# Patient Record
Sex: FEMALE | Race: WHITE | Employment: UNEMPLOYED | URBAN - METROPOLITAN AREA
[De-identification: names, ages, dates, MRNs, and addresses within clinical notes are randomized per-mention and may not be internally consistent; named-entity substitution may affect disease eponyms.]

---

## 2017-01-06 ENCOUNTER — TELEPHONE (OUTPATIENT)
Dept: PAIN MANAGEMENT | Age: 47
End: 2017-01-06

## 2017-01-06 NOTE — TELEPHONE ENCOUNTER
The pt called the office to request a one day early fill on her medication due to inclement weather. She is due to fill tomorrow 01/07/17. I spoke to the provider about the pt's request and she states that this is ok to do for the pt. I called the pt's [facility] and spoke to the pharmacist. They will not take the verbal order over the phone and need to speak to the provider that ordered the prescription. The provider had to leave the office for a family emergency and was not able to be contacted for the pharmacy. The pt will not be able to get her medication today. The pharmacist would not speak to the supervising physician and would only speak to the provider that wrote the prescription. The Clinical Supervisior Cynthia Knox RN will call and inform the pt.

## 2017-01-17 DIAGNOSIS — I10 ESSENTIAL HYPERTENSION: ICD-10-CM

## 2017-01-18 RX ORDER — LISINOPRIL 40 MG/1
TABLET ORAL
Qty: 30 TAB | Refills: 4 | Status: SHIPPED | OUTPATIENT
Start: 2017-01-18 | End: 2017-04-25 | Stop reason: SDUPTHER

## 2017-02-01 ENCOUNTER — OFFICE VISIT (OUTPATIENT)
Dept: PAIN MANAGEMENT | Age: 47
End: 2017-02-01

## 2017-02-01 VITALS
BODY MASS INDEX: 39.68 KG/M2 | HEART RATE: 84 BPM | DIASTOLIC BLOOD PRESSURE: 108 MMHG | WEIGHT: 261 LBS | SYSTOLIC BLOOD PRESSURE: 176 MMHG

## 2017-02-01 DIAGNOSIS — M51.37 DEGENERATION OF LUMBAR OR LUMBOSACRAL INTERVERTEBRAL DISC: ICD-10-CM

## 2017-02-01 DIAGNOSIS — M46.1 SACROILIITIS (HCC): ICD-10-CM

## 2017-02-01 DIAGNOSIS — M47.817 LUMBOSACRAL SPONDYLOSIS WITHOUT MYELOPATHY: ICD-10-CM

## 2017-02-01 DIAGNOSIS — M54.17 LUMBOSACRAL RADICULOPATHY: Primary | ICD-10-CM

## 2017-02-01 DIAGNOSIS — Z79.899 ENCOUNTER FOR LONG-TERM (CURRENT) USE OF HIGH-RISK MEDICATION: ICD-10-CM

## 2017-02-01 DIAGNOSIS — Z79.899 ENCOUNTER FOR LONG-TERM (CURRENT) USE OF MEDICATIONS: ICD-10-CM

## 2017-02-01 DIAGNOSIS — G56.03 BILATERAL CARPAL TUNNEL SYNDROME: ICD-10-CM

## 2017-02-01 LAB
ALCOHOL UR POC: NORMAL
AMPHETAMINES UR POC: NEGATIVE
BARBITURATES UR POC: NEGATIVE
BENZODIAZEPINES UR POC: NEGATIVE
BUPRENORPHINE UR POC: NORMAL
CANNABINOIDS UR POC: NEGATIVE
CARISOPRODOL UR POC: NORMAL
COCAINE UR POC: NEGATIVE
FENTANYL UR POC: NORMAL
MDMA/ECSTASY UR POC: NEGATIVE
METHADONE UR POC: NORMAL
METHAMPHETAMINE UR POC: NEGATIVE
METHYLPHENIDATE UR POC: NEGATIVE
OPIATES UR POC: NORMAL
OXYCODONE UR POC: NEGATIVE
PHENCYCLIDINE UR POC: NORMAL
PROPOXYPHENE UR POC: NORMAL
TRAMADOL UR POC: NORMAL
TRICYCLICS UR POC: NEGATIVE

## 2017-02-01 RX ORDER — HYDROMORPHONE HYDROCHLORIDE 4 MG/1
4 TABLET ORAL 4 TIMES DAILY
Qty: 120 TAB | Refills: 0 | Status: SHIPPED | OUTPATIENT
Start: 2017-04-04 | End: 2017-02-01 | Stop reason: SDUPTHER

## 2017-02-01 RX ORDER — METHADONE HYDROCHLORIDE 10 MG/1
20 TABLET ORAL EVERY 8 HOURS
Qty: 180 TAB | Refills: 0 | Status: SHIPPED | OUTPATIENT
Start: 2017-04-04 | End: 2017-05-02 | Stop reason: SDUPTHER

## 2017-02-01 RX ORDER — HYDROMORPHONE HYDROCHLORIDE 4 MG/1
4 TABLET ORAL 4 TIMES DAILY
Qty: 120 TAB | Refills: 0 | Status: SHIPPED | OUTPATIENT
Start: 2017-03-06 | End: 2017-05-02 | Stop reason: SDUPTHER

## 2017-02-01 RX ORDER — METHADONE HYDROCHLORIDE 10 MG/1
20 TABLET ORAL EVERY 8 HOURS
Qty: 180 TAB | Refills: 0 | Status: SHIPPED | OUTPATIENT
Start: 2017-02-05 | End: 2017-05-02 | Stop reason: SDUPTHER

## 2017-02-01 RX ORDER — METHADONE HYDROCHLORIDE 10 MG/1
20 TABLET ORAL EVERY 8 HOURS
Qty: 180 TAB | Refills: 0 | Status: SHIPPED | OUTPATIENT
Start: 2017-03-06 | End: 2017-05-02 | Stop reason: SDUPTHER

## 2017-02-01 RX ORDER — HYDROMORPHONE HYDROCHLORIDE 4 MG/1
4 TABLET ORAL 4 TIMES DAILY
Qty: 120 TAB | Refills: 0 | Status: SHIPPED | OUTPATIENT
Start: 2017-02-05 | End: 2017-05-02 | Stop reason: SDUPTHER

## 2017-02-01 NOTE — MR AVS SNAPSHOT
Visit Information Date & Time Provider Department Dept. Phone Encounter #  
 2/1/2017 11:00 AM Kathryn Horton MD [facility]  Follow-up Instructions Return in about 3 months (around 5/1/2017). Follow-up and Disposition History Upcoming Health Maintenance Date Due  
 PAP AKA CERVICAL CYTOLOGY 2/10/1990 INFLUENZA AGE 9 TO ADULT 8/1/2016 DTaP/Tdap/Td series (2 - Td) 7/12/2026 Allergies as of 2/1/2017  Review Complete On: 2/1/2017 By: Kathryn Horton MD  
  
 Severity Noted Reaction Type Reactions Bactrim [Sulfamethoprim Ds]  02/20/2015    Rash Current Immunizations  Reviewed on 12/28/2012 No immunizations on file. Not reviewed this visit You Were Diagnosed With   
  
 Codes Comments Lumbosacral radiculopathy    -  Primary ICD-10-CM: M54.17 ICD-9-CM: 724.4 Encounter for long-term (current) use of medications     ICD-10-CM: Z79.899 ICD-9-CM: V58.69 Bilateral carpal tunnel syndrome     ICD-10-CM: G56.03 
ICD-9-CM: 354.0 Lumbosacral spondylosis without myelopathy     ICD-10-CM: W90.568 ICD-9-CM: 721.3 Sacroiliitis ([facility] [address]     ICD-10-CM: M46.1 ICD-9-CM: 720.2 Degeneration of lumbar or lumbosacral intervertebral disc     ICD-10-CM: M51.37 
ICD-9-CM: 722.52 Encounter for long-term (current) use of high-risk medication     ICD-10-CM: Z79.899 ICD-9-CM: V58.69 Vitals BP Pulse Weight(growth percentile) BMI OB Status Smoking Status (!) 176/108 (BP 1 Location: Right arm, BP Patient Position: Sitting) 84 261 lb (118.4 kg) 39.68 kg/m2 Postmenopausal Former Smoker Vitals History BMI and BSA Data Body Mass Index Body Surface Area  
 39.68 kg/m 2 2.38 m 2 Preferred Pharmacy Pharmacy Name Phone [facility] [address] [place], [address] [phone] Your Updated Medication List  
  
   
 This list is accurate as of: 2/1/17  1:00 PM.  Always use your most recent med list. amLODIPine 5 mg tablet Commonly known as:  Isabella Fraction Take 1 Tab by mouth daily. buPROPion  mg tablet Commonly known as:  Arnulfo Greaser Take 1 Tab by mouth every morning. * HYDROmorphone 4 mg tablet Commonly known as:  DILAUDID Take 1 Tab by mouth four (4) times daily for 30 days. Max Daily Amount: 16 mg. Prn severe pain  Indications: PAIN Start taking on:  2/5/2017  
  
 * HYDROmorphone 4 mg tablet Commonly known as:  DILAUDID Take 1 Tab by mouth four (4) times daily for 30 days. Max Daily Amount: 16 mg. Prn severe pain  Indications: PAIN Start taking on:  3/6/2017  
  
 lisinopril 40 mg tablet Commonly known as:  PRINIVIL, ZESTRIL  
TAKE 1 TABLET BY MOUTH DAILY * methadone 10 mg tablet Commonly known as:  DOLOPHINE Take  by mouth every four (4) hours as needed for Pain. * methadone 10 mg tablet Commonly known as:  DOLOPHINE Take 2 Tabs by mouth every eight (8) hours for 30 days. For chronic pain. Indications: CHRONIC PAIN, SEVERE PAIN Start taking on:  2/5/2017 * methadone 10 mg tablet Commonly known as:  DOLOPHINE Take 2 Tabs by mouth every eight (8) hours for 30 days. For chronic pain. Indications: CHRONIC PAIN, SEVERE PAIN Start taking on:  3/6/2017 * methadone 10 mg tablet Commonly known as:  DOLOPHINE Take 2 Tabs by mouth every eight (8) hours for 30 days. For chronic pain. Indications: CHRONIC PAIN, SEVERE PAIN Start taking on:  4/4/2017 OTHER Portable lightweight wheelchair * Notice: This list has 6 medication(s) that are the same as other medications prescribed for you. Read the directions carefully, and ask your doctor or other care provider to review them with you. Prescriptions Printed Refills  
 methadone (DOLOPHINE) 10 mg tablet 0 Starting on: 4/4/2017 Sig: Take 2 Tabs by mouth every eight (8) hours for 30 days. For chronic pain. Indications: CHRONIC PAIN, SEVERE PAIN Class: Print Route: Oral  
 methadone (DOLOPHINE) 10 mg tablet 0 Starting on: 3/6/2017 Sig: Take 2 Tabs by mouth every eight (8) hours for 30 days. For chronic pain. Indications: CHRONIC PAIN, SEVERE PAIN Class: Print Route: Oral  
 methadone (DOLOPHINE) 10 mg tablet 0 Starting on: 2/5/2017 Sig: Take 2 Tabs by mouth every eight (8) hours for 30 days. For chronic pain. Indications: CHRONIC PAIN, SEVERE PAIN Class: Print Route: Oral  
 HYDROmorphone (DILAUDID) 4 mg tablet 0 Starting on: 3/6/2017 Sig: Take 1 Tab by mouth four (4) times daily for 30 days. Max Daily Amount: 16 mg. Prn severe pain  Indications: PAIN Class: Print Route: Oral  
 HYDROmorphone (DILAUDID) 4 mg tablet 0 Starting on: 2/5/2017 Sig: Take 1 Tab by mouth four (4) times daily for 30 days. Max Daily Amount: 16 mg. Prn severe pain  Indications: PAIN Class: Print Route: Oral  
  
We Performed the Following AMB POC DRUG SCREEN () [ HCP] AMB POC EKG ROUTINE W/ 12 LEADS, INTER & REP [24671 CPT(R)] DRUG SCREEN [[id number] Custom] Follow-up Instructions Return in about 3 months (around 5/1/2017). Patient Instructions Current health maintenance issues were reviewed and the patient was advised to followup with his/her PCP for completion of these items. Introducing hospitals & [facility] SERVICES! Luis Cohen introduces [employer] patient portal. Now you can access parts of your medical record, email your doctor's office, and request medication refills online. 1. In your internet browser, go to [email or web address] 2. Click on the First Time User? Click Here link in the Sign In box. You will see the New Member Sign Up page. 3. Enter your [employer] Access Code exactly as it appears below.  You will not need to use this code after youve completed the sign-up process. If you do not sign up before the expiration date, you must request a new code. · [employer] Access Code: [email or web address] Expires: 5/2/2017 11:49 AM 
 
4. Enter the last four digits of your Social Security Number (xxxx) and Date of Birth (mm/dd/yyyy) as indicated and click Submit. You will be taken to the next sign-up page. 5. Create a [employer] ID. This will be your [employer] login ID and cannot be changed, so think of one that is secure and easy to remember. 6. Create a [employer] password. You can change your password at any time. 7. Enter your Password Reset Question and Answer. This can be used at a later time if you forget your password. 8. Enter your e-mail address. You will receive e-mail notification when new information is available in [address] 9. Click Sign Up. You can now view and download portions of your medical record. 10. Click the Download Summary menu link to download a portable copy of your medical information. If you have questions, please visit the Frequently Asked Questions section of the [employer] website. Remember, [employer] is NOT to be used for urgent needs. For medical emergencies, dial 911. Now available from your iPhone and Android! Please provide this summary of care documentation to your next provider. Your primary care clinician is listed as Eric Soares. If you have any questions after today's visit, please call [phone].

## 2017-02-01 NOTE — PROGRESS NOTES
Nursing Notes    Patient presents to the office today in follow-up. Patient rates her pain at 6/10 on the numerical pain scale. Reviewed medications with counts as follows:    Rx Date filled Qty Dispensed Pill Count Last Dose Short     Dilaudid 4mg 01/07/2017 120 15 This am  No    Methadone 10mg 01/07/2017 180 25 This am  No                                     Comments:     POC UDS was performed in office today    Any new labs or imaging since last appointment? NO    Have you been to an emergency room (ER) or urgent care clinic since your last visit? NO            Have you been hospitalized since your last visit? NO     If yes, where, when, and reason for visit? Have you seen or consulted any other health care providers outside of the [address] [place] [address]  since your last visit? NO     If yes, where, when, and reason for visit? HM deferred to pcp.

## 2017-02-01 NOTE — PROGRESS NOTES
HISTORY OF PRESENT ILLNESS  Vivian Newby is a 51 y.o. female. HPI she returns for follow-up of chronic, severe pain involving the cervical and lumbar spine related to cervical and lumbar degenerative disc disease with spondylosis and radiculopathy. Since last seen, she has been experiencing increasingly severe left knee pain swelling and crepitus. No clear history of trauma is elicited. She is scheduled to follow-up with her PCP and will obtain x-ray studies which she will forward to us. The possibility of intra-articular steroid injection and MRI was discussed with patient and will be dependent on the results of the x-rays. Pain continues under good control, averaging 6 out of 10 with 60% overall relief. She still has not obtained her MRI of the lumbar spine which will be reordered. Physical activity and mobility, mood, and sleep are all fair. No reported side effects. A review of the [place] prescription monitoring program does not identify any inconsistency    UDS obtained and reviewed; formal confirmation from laboratory is pending. Review of Systems   Constitutional: Positive for malaise/fatigue. Negative for chills and fever. Eyes: Negative for blurred vision and double vision. Respiratory: Negative for cough and shortness of breath. Cardiovascular: Negative for chest pain and leg swelling. Gastrointestinal: Negative for constipation. Musculoskeletal: Positive for back pain, joint pain, myalgias and neck pain. Negative for falls. Neurological: Positive for headaches. Endo/Heme/Allergies: Does not bruise/bleed easily. Psychiatric/Behavioral: Positive for depression. Negative for suicidal ideas. The patient is not nervous/anxious and does not have insomnia. All other systems reviewed and are negative. Physical Exam   Constitutional: She is oriented to person, place, and time. She appears well-developed and well-nourished. No distress.    Overweight    HENT: Head: Normocephalic. Eyes: Conjunctivae and EOM are normal. Pupils are equal, round, and reactive to light.   glasses   Neck: Normal range of motion. No thyromegaly present. Painful ROM   Pulmonary/Chest: Effort normal. No respiratory distress. Musculoskeletal: She exhibits tenderness (neck/lumbar). She exhibits no edema. Right hip: She exhibits decreased range of motion and tenderness (over TB). Left hip: She exhibits tenderness (over TB). Cervical back: She exhibits decreased range of motion, tenderness, pain and spasm. Lumbar back: She exhibits decreased range of motion, tenderness, pain and spasm. Neurological: She is alert and oriented to person, place, and time. Gait (antalgic) abnormal.   Psychiatric: She has a normal mood and affect. Her behavior is normal. Judgment and thought content normal.   Nursing note and vitals reviewed. ASSESSMENT and PLAN  Encounter Diagnoses   Name Primary?  Lumbosacral radiculopathy Yes    Encounter for long-term (current) use of medications     Bilateral carpal tunnel syndrome     Lumbosacral spondylosis without myelopathy     Sacroiliitis (HCC)     Degeneration of lumbar or lumbosacral intervertebral disc     Encounter for long-term (current) use of high-risk medication      Treatment plan as noted above. She will continue on her current analgesic regimen as this is providing good pain control with improve functionality and minimal side effects. 3 month reassess her    No concerns are raised for misuse, abuse, or diversion. 1. Pain medications are prescribed with the objective of pain relief and improved physical and psychosocial function in this patient. 2. Counseled patient on proper use of prescribed medications and reviewed opioid contract. 3. Counseled patient about chronic medical conditions and their relationship to anxiety and depression and recommended mental health support as needed.   4. Reviewed with patient self-help tools, home exercise, and lifestyle changes to assist the patient in self-management of symptoms. 5. Advised patient to have a primary care provider to continue care for health maintenance and general medical conditions and support for referral to specialty care as needed. 6. Reviewed with patient the treatment plan, goals of treatment plan, and limitations of treatment plan, to include the potential for side effects from medications and procedures. If side effects occur, it is the responsibility of the patient to inform the clinic so that a change in the treatment plan can be made in a safe manner. The patient is advised that stopping prescribed medication may cause an increase in symptoms and possible medication withdrawal symptoms. The patient is informed an emergency room evaluation may be necessary if this occurs. DISPOSITION: The patients condition and plan were discussed at length and all questions were answered. The patient agrees with the plan.     Counseling occupied > 50% of visit:  Total time: 30 minutes

## 2017-04-25 ENCOUNTER — OFFICE VISIT (OUTPATIENT)
Dept: INTERNAL MEDICINE CLINIC | Age: 47
End: 2017-04-25

## 2017-04-25 VITALS
DIASTOLIC BLOOD PRESSURE: 83 MMHG | RESPIRATION RATE: 18 BRPM | HEIGHT: 68 IN | BODY MASS INDEX: 42.28 KG/M2 | SYSTOLIC BLOOD PRESSURE: 138 MMHG | WEIGHT: 279 LBS | TEMPERATURE: 98.2 F | HEART RATE: 76 BPM

## 2017-04-25 DIAGNOSIS — Z71.89 ADVANCE DIRECTIVE DISCUSSED WITH PATIENT: ICD-10-CM

## 2017-04-25 DIAGNOSIS — I10 ESSENTIAL HYPERTENSION: Primary | ICD-10-CM

## 2017-04-25 DIAGNOSIS — Z00.00 ENCOUNTER FOR MEDICARE ANNUAL WELLNESS EXAM: ICD-10-CM

## 2017-04-25 RX ORDER — LISINOPRIL 40 MG/1
TABLET ORAL
Qty: 30 TAB | Refills: 4 | Status: SHIPPED | OUTPATIENT
Start: 2017-04-25 | End: 2017-07-11 | Stop reason: ALTCHOICE

## 2017-04-25 NOTE — MR AVS SNAPSHOT
Visit Information Date & Time Provider Department Dept. Phone Encounter #  
 4/25/2017  5:30 PM Volodymyr Barrow PA-C [facility] [phone] [id number] Follow-up Instructions Return if symptoms worsen or fail to improve. Your Appointments 5/2/2017  1:00 PM  
Follow Up with Ralf Peter MD  
[facility]) Appt Note: Return in  [address] [address]  
[phone] Tiffanie Gillespie [phone] [id number] Upcoming Health Maintenance Date Due  
 PAP AKA CERVICAL CYTOLOGY 2/10/1990 INFLUENZA AGE 9 TO ADULT 8/1/2016 DTaP/Tdap/Td series (2 - Td) 7/12/2026 Allergies as of 4/25/2017  Review Complete On: 4/25/2017 By: Volodymyr Barrow PA-C Severity Noted Reaction Type Reactions Bactrim [Sulfamethoprim Ds]  02/20/2015    Rash Current Immunizations  Reviewed on 12/28/2012 No immunizations on file. Not reviewed this visit You Were Diagnosed With   
  
 Codes Comments Essential hypertension     ICD-10-CM: I10 
ICD-9-CM: 401.9 Vitals BP Pulse Temp Resp Height(growth percentile) Weight(growth percentile) 138/83 (BP 1 Location: Left arm, BP Patient Position: Sitting) 76 98.2 °F (36.8 °C) (Oral) 18 5' 8\" (1.727 m) 279 lb (126.6 kg) BMI OB Status Smoking Status 42.42 kg/m2 Postmenopausal Former Smoker Vitals History BMI and BSA Data Body Mass Index Body Surface Area  
 42.42 kg/m 2 2.46 m 2 Preferred Pharmacy Pharmacy Name Phone [facility] [address] [facility], [address] Your Updated Medication List  
  
   
This list is accurate as of: 4/25/17 11:59 PM.  Always use your most recent med list.  
  
  
  
  
 lisinopril 40 mg tablet Commonly known as:  PRINIVIL, ZESTRIL  
TAKE 1 TABLET BY MOUTH DAILY * methadone 10 mg tablet Commonly known as:  DOLOPHINE Take  by mouth every four (4) hours as needed for Pain. * methadone 10 mg tablet Commonly known as:  DOLOPHINE Take 2 Tabs by mouth every eight (8) hours for 30 days. For chronic pain. Indications: CHRONIC PAIN, SEVERE PAIN  
  
 OTHER Portable lightweight wheelchair * Notice: This list has 2 medication(s) that are the same as other medications prescribed for you. Read the directions carefully, and ask your doctor or other care provider to review them with you. Prescriptions Sent to Pharmacy Refills  
 lisinopril (PRINIVIL, ZESTRIL) 40 mg tablet 4 Sig: TAKE 1 TABLET BY MOUTH DAILY Class: Normal  
 Pharmacy: [address] [facility] [address], [address] [place] [address] #: [phone] Follow-up Instructions Return if symptoms worsen or fail to improve. Patient Instructions DASH Diet: Care Instructions Your Care Instructions The DASH diet is an eating plan that can help lower your blood pressure. DASH stands for Dietary Approaches to Stop Hypertension. Hypertension is high blood pressure. The DASH diet focuses on eating foods that are high in calcium, potassium, and magnesium. These nutrients can lower blood pressure. The foods that are highest in these nutrients are fruits, vegetables, low-fat dairy products, nuts, seeds, and legumes. But taking calcium, potassium, and magnesium supplements instead of eating foods that are high in those nutrients does not have the same effect. The DASH diet also includes whole grains, fish, and poultry. The DASH diet is one of several lifestyle changes your doctor may recommend to lower your high blood pressure. Your doctor may also want you to decrease the amount of sodium in your diet. Lowering sodium while following the DASH diet can lower blood pressure even further than just the DASH diet alone. Follow-up care is a key part of your treatment and safety.  Be sure to make and go to all appointments, and call your doctor if you are having problems. It's also a good idea to know your test results and keep a list of the medicines you take. How can you care for yourself at home? Following the DASH diet · Eat 4 to 5 servings of fruit each day. A serving is 1 medium-sized piece of fruit, ½ cup chopped or canned fruit, 1/4 cup dried fruit, or 4 ounces (½ cup) of fruit juice. Choose fruit more often than fruit juice. · Eat 4 to 5 servings of vegetables each day. A serving is 1 cup of lettuce or raw leafy vegetables, ½ cup of chopped or cooked vegetables, or 4 ounces (½ cup) of vegetable juice. Choose vegetables more often than vegetable juice. · Get 2 to 3 servings of low-fat and fat-free dairy each day. A serving is 8 ounces of milk, 1 cup of yogurt, or 1 ½ ounces of cheese. · Eat 6 to 8 servings of grains each day. A serving is 1 slice of bread, 1 ounce of dry cereal, or ½ cup of cooked rice, pasta, or cooked cereal. Try to choose whole-grain products as much as possible. · Limit lean meat, poultry, and fish to 2 servings each day. A serving is 3 ounces, about the size of a deck of cards. · Eat 4 to 5 servings of nuts, seeds, and legumes (cooked dried beans, lentils, and split peas) each week. A serving is 1/3 cup of nuts, 2 tablespoons of seeds, or ½ cup of cooked beans or peas. · Limit fats and oils to 2 to 3 servings each day. A serving is 1 teaspoon of vegetable oil or 2 tablespoons of salad dressing. · Limit sweets and added sugars to 5 servings or less a week. A serving is 1 tablespoon jelly or jam, ½ cup sorbet, or 1 cup of lemonade. · Eat less than 2,300 milligrams (mg) of sodium a day. If you limit your sodium to 1,500 mg a day, you can lower your blood pressure even more. Tips for success · Start small. Do not try to make dramatic changes to your diet all at once.  You might feel that you are missing out on your favorite foods and then be more likely to not follow the plan. Make small changes, and stick with them. Once those changes become habit, add a few more changes. · Try some of the following: ¨ Make it a goal to eat a fruit or vegetable at every meal and at snacks. This will make it easy to get the recommended amount of fruits and vegetables each day. ¨ Try yogurt topped with fruit and nuts for a snack or healthy dessert. ¨ Add lettuce, tomato, cucumber, and onion to sandwiches. ¨ Combine a ready-made pizza crust with low-fat mozzarella cheese and lots of vegetable toppings. Try using tomatoes, squash, spinach, broccoli, carrots, cauliflower, and onions. ¨ Have a variety of cut-up vegetables with a low-fat dip as an appetizer instead of chips and dip. ¨ Sprinkle sunflower seeds or chopped almonds over salads. Or try adding chopped walnuts or almonds to cooked vegetables. ¨ Try some vegetarian meals using beans and peas. Add garbanzo or kidney beans to salads. Make burritos and tacos with mashed medeiros beans or black beans. Where can you learn more? Go to http://zenia-alana.info/. Enter D682 in the search box to learn more about \"DASH Diet: Care Instructions. \" Current as of: March 23, 2016 Content Version: 11.2 © 7636-1840 [employer]. Care instructions adapted under license by [employer] (which disclaims liability or warranty for this information). If you have questions about a medical condition or this instruction, always ask your healthcare professional. [address] [place] [address] any warranty or liability for your use of this information. Introducing [facility] & HEALTH SERVICES! Flora Part introduces [employer] patient portal. Now you can access parts of your medical record, email your doctor's office, and request medication refills online. 1. In your internet browser, go to [email or web address] 2. Click on the First Time User? Click Here link in the Sign In box. You will see the New Member Sign Up page. 3. Enter your [employer] Access Code exactly as it appears below. You will not need to use this code after youve completed the sign-up process. If you do not sign up before the expiration date, you must request a new code. · [employer] Access Code: [email or web address] Expires: 5/2/2017 12:49 PM 
 
4. Enter the last four digits of your Social Security Number (xxxx) and Date of Birth (mm/dd/yyyy) as indicated and click Submit. You will be taken to the next sign-up page. 5. Create a [employer] ID. This will be your [employer] login ID and cannot be changed, so think of one that is secure and easy to remember. 6. Create a [employer] password. You can change your password at any time. 7. Enter your Password Reset Question and Answer. This can be used at a later time if you forget your password. 8. Enter your e-mail address. You will receive e-mail notification when new information is available in [address] 9. Click Sign Up. You can now view and download portions of your medical record. 10. Click the Download Summary menu link to download a portable copy of your medical information. If you have questions, please visit the Frequently Asked Questions section of the [employer] website. Remember, [employer] is NOT to be used for urgent needs. For medical emergencies, dial 911. Now available from your iPhone and Android! Please provide this summary of care documentation to your next provider. Your primary care clinician is listed as Betzaida Soares. If you have any questions after today's visit, please call [phone].

## 2017-04-28 NOTE — PROGRESS NOTES
HISTORY OF PRESENT ILLNESS  Keenan Pineda is a 49 y.o. female. HPI   Pt is here for f/u on her BP and refills on meds. Pt just had abx for MRSA infection and abscess which is healing well. She is also due for her medicare wellness exam. All HM is UTD except PAP which pt will schedule. Allergies   Allergen Reactions    Bactrim [Sulfamethoprim Ds] Rash       Current Outpatient Prescriptions   Medication Sig    lisinopril (PRINIVIL, ZESTRIL) 40 mg tablet TAKE 1 TABLET BY MOUTH DAILY    methadone (DOLOPHINE) 10 mg tablet Take  by mouth every four (4) hours as needed for Pain.  OTHER Portable lightweight wheelchair    naloxone 4 mg/actuation spry 4 mg by Nasal route as needed for up to 2 doses. Indications: OPIOID TOXICITY    [START ON 6/29/2017] methadone (DOLOPHINE) 10 mg tablet Take 2 Tabs by mouth every eight (8) hours for 30 days. For chronic pain. Indications: Chronic Pain, Severe Pain    [START ON 5/31/2017] methadone (DOLOPHINE) 10 mg tablet Take 2 Tabs by mouth every eight (8) hours for 30 days. For chronic pain. Indications: Chronic Pain, Severe Pain    methadone (DOLOPHINE) 10 mg tablet Take 2 Tabs by mouth every eight (8) hours for 30 days. For chronic pain. Indications: Chronic Pain, Severe Pain    [START ON 6/29/2017] HYDROmorphone (DILAUDID) 4 mg tablet Take 1 Tab by mouth four (4) times daily for 30 days. Max Daily Amount: 16 mg. Prn severe pain  Indications: Pain, Severe Pain    [START ON 5/31/2017] HYDROmorphone (DILAUDID) 4 mg tablet Take 1 Tab by mouth four (4) times daily for 30 days. Max Daily Amount: 16 mg. Prn severe pain  Indications: Pain, Severe Pain    HYDROmorphone (DILAUDID) 4 mg tablet Take 1 Tab by mouth four (4) times daily for 30 days. Max Daily Amount: 16 mg. Prn severe pain  Indications: Pain     No current facility-administered medications for this visit. Review of Systems   Constitutional: Negative. Negative for chills, fever and malaise/fatigue.    HENT: Negative. Negative for congestion, ear pain, sore throat and tinnitus. Eyes: Negative. Negative for blurred vision, double vision and photophobia. Respiratory: Negative. Negative for cough, shortness of breath and wheezing. Cardiovascular: Negative. Negative for chest pain, palpitations and leg swelling. Gastrointestinal: Negative. Negative for abdominal pain, heartburn, nausea and vomiting. Genitourinary: Negative. Negative for dysuria, frequency, hematuria and urgency. Musculoskeletal: Negative. Negative for back pain, joint pain, myalgias and neck pain. Skin: Negative. Negative for itching and rash. Neurological: Negative. Negative for dizziness, tingling, tremors and headaches. Psychiatric/Behavioral: Negative. Negative for depression and memory loss. The patient is not nervous/anxious and does not have insomnia. Visit Vitals    /83 (BP 1 Location: Left arm, BP Patient Position: Sitting)    Pulse 76    Temp 98.2 °F (36.8 °C) (Oral)    Resp 18    Ht 5' 8\" (1.727 m)    Wt 279 lb (126.6 kg)    BMI 42.42 kg/m2       Physical Exam   Constitutional: She is oriented to person, place, and time. No distress. Pt is obese and has very strong body odor   HENT:   Head: Normocephalic and atraumatic. Eyes: EOM are normal.   Neck: Normal range of motion. Neck supple. Cardiovascular: Normal rate, regular rhythm and normal heart sounds. Exam reveals no gallop and no friction rub. No murmur heard. Pulmonary/Chest: Effort normal and breath sounds normal. She has no wheezes. Abdominal: Soft. Bowel sounds are normal.   Neurological: She is alert and oriented to person, place, and time. She has normal reflexes. Skin: Skin is warm and dry. She is not diaphoretic. Psychiatric: She has a normal mood and affect. Her behavior is normal.       ASSESSMENT and PLAN    ICD-10-CM ICD-9-CM    1. Essential hypertension I10 401.9 lisinopril (PRINIVIL, ZESTRIL) 40 mg tablet   2. Encounter for Medicare annual wellness exam Z00.00 V70.0    3. Advance directive discussed with patient Z71.89 V65.49      Follow-up Disposition:  Return in about 1 year (around 4/25/2018) for wellness. Pt expressed understanding of visit summary and plans for any follow ups or referrals as well as any medications prescribed. Veronica Sebastian is a 49 y.o. female and presents for annual Medicare Wellness Visit. Problem List: Reviewed with patient and discussed risk factors. Patient Active Problem List   Diagnosis Code    Hypertension I10    Obesity E66.9    Current smoker F17.200    Chronic low back pain M54.5, G89.29    Degeneration of lumbar or lumbosacral intervertebral disc M51.37    Lumbosacral radiculopathy M54.17    Pain in limb M79.609    Carpal tunnel syndrome G56.00    Acroparesthesia (HCC) I73.89    Chronic pain G89.29    Lumbosacral spondylosis without myelopathy M47.817    Sacroiliitis (HCC) M46.1    Advance directive discussed with patient Z70.80       Current medical providers:  Patient Care Team:  Myriam Olivarez PA-C as PCP - General (Physician Assistant)  Cristine Santos RN as Ambulatory Care Navigator  Kristen Louie MD (Pain Management)    PSH: Reviewed with patient  Past Surgical History:   Procedure Laterality Date    HX CHOLECYSTECTOMY          SH: Reviewed with patient  Social History   Substance Use Topics    Smoking status: Former Smoker     Packs/day: 1.00     Years: 26.00     Quit date: 9/15/2015    Smokeless tobacco: Never Used    Alcohol use No       FH: Reviewed with patient  Family History   Problem Relation Age of Onset    Hypertension Father     Cancer Father      lymph       Medications/Allergies: Reviewed with patient  Current Outpatient Prescriptions on File Prior to Visit   Medication Sig Dispense Refill    methadone (DOLOPHINE) 10 mg tablet Take  by mouth every four (4) hours as needed for Pain.       OTHER Portable lightweight wheelchair 1 Device prn     No current facility-administered medications on file prior to visit. Allergies   Allergen Reactions    Bactrim [Sulfamethoprim Ds] Rash       Objective:  Visit Vitals    /83 (BP 1 Location: Left arm, BP Patient Position: Sitting)    Pulse 76    Temp 98.2 °F (36.8 °C) (Oral)    Resp 18    Ht 5' 8\" (1.727 m)    Wt 279 lb (126.6 kg)    BMI 42.42 kg/m2    Body mass index is 42.42 kg/(m^2). Assessment of cognitive impairment: Alert and oriented x 3    Depression Screen:   PHQ over the last two weeks 4/25/2017   PHQ Not Done Active Diagnosis of Depression or Bipolar Disorder   Little interest or pleasure in doing things -   Feeling down, depressed or hopeless -   Total Score PHQ 2 -   Trouble falling or staying asleep, or sleeping too much -   Feeling tired or having little energy -   Poor appetite or overeating -   Feeling bad about yourself - or that you are a failure or have let yourself or your family down -   Trouble concentrating on things such as school, work, reading or watching TV -   Moving or speaking so slowly that other people could have noticed; or the opposite being so fidgety that others notice -   Thoughts of being better off dead, or hurting yourself in some way -   How difficult have these problems made it for you to do your work, take care of your home and get along with others -       Fall Risk Assessment:    Fall Risk Assessment, last 12 mths 2/19/2014   Able to walk? Yes   Fall in past 12 months? Yes   Fall with injury? Yes   Number of falls in past 12 months 3       Functional Ability:   Does the patient exhibit a steady gait? yes   How long did it take the patient to get up and walk from a sitting position? <10sec   Is the patient self reliant?  (ie can do own laundry, meals, household chores)  yes     Does the patient handle his/her own medications? yes     Does the patient handle his/her own money?    yes     Is the patients home safe (ie good lighting, handrails on stairs and bath, etc.)? yes     Did you notice or did patient express any hearing difficulties? no     Did you notice or did patient express any vision difficulties?   no     Were distance and reading eye charts used? yes       Advance Care Planning:   Patient was offered the opportunity to discuss advance care planning:  yes     Does patient have an Advance Directive:  no   If no, did you provide information on Caring Connections? yes       Plan:      Orders Placed This Encounter    lisinopril (PRINIVIL, ZESTRIL) 40 mg tablet       Health Maintenance   Topic Date Due    PAP AKA CERVICAL CYTOLOGY  02/10/1990    INFLUENZA AGE 9 TO ADULT  08/01/2017    DTaP/Tdap/Td series (2 - Td) 07/12/2026       *Patient verbalized understanding and agreement with the plan. A copy of the After Visit Summary with personalized health plan was given to the patient today.

## 2017-04-28 NOTE — PATIENT INSTRUCTIONS
DASH Diet: Care Instructions  Your Care Instructions  The DASH diet is an eating plan that can help lower your blood pressure. DASH stands for Dietary Approaches to Stop Hypertension. Hypertension is high blood pressure. The DASH diet focuses on eating foods that are high in calcium, potassium, and magnesium. These nutrients can lower blood pressure. The foods that are highest in these nutrients are fruits, vegetables, low-fat dairy products, nuts, seeds, and legumes. But taking calcium, potassium, and magnesium supplements instead of eating foods that are high in those nutrients does not have the same effect. The DASH diet also includes whole grains, fish, and poultry. The DASH diet is one of several lifestyle changes your doctor may recommend to lower your high blood pressure. Your doctor may also want you to decrease the amount of sodium in your diet. Lowering sodium while following the DASH diet can lower blood pressure even further than just the DASH diet alone. Follow-up care is a key part of your treatment and safety. Be sure to make and go to all appointments, and call your doctor if you are having problems. It's also a good idea to know your test results and keep a list of the medicines you take. How can you care for yourself at home? Following the DASH diet  · Eat 4 to 5 servings of fruit each day. A serving is 1 medium-sized piece of fruit, ½ cup chopped or canned fruit, 1/4 cup dried fruit, or 4 ounces (½ cup) of fruit juice. Choose fruit more often than fruit juice. · Eat 4 to 5 servings of vegetables each day. A serving is 1 cup of lettuce or raw leafy vegetables, ½ cup of chopped or cooked vegetables, or 4 ounces (½ cup) of vegetable juice. Choose vegetables more often than vegetable juice. · Get 2 to 3 servings of low-fat and fat-free dairy each day. A serving is 8 ounces of milk, 1 cup of yogurt, or 1 ½ ounces of cheese. · Eat 6 to 8 servings of grains each day.  A serving is 1 slice of bread, 1 ounce of dry cereal, or ½ cup of cooked rice, pasta, or cooked cereal. Try to choose whole-grain products as much as possible. · Limit lean meat, poultry, and fish to 2 servings each day. A serving is 3 ounces, about the size of a deck of cards. · Eat 4 to 5 servings of nuts, seeds, and legumes (cooked dried beans, lentils, and split peas) each week. A serving is 1/3 cup of nuts, 2 tablespoons of seeds, or ½ cup of cooked beans or peas. · Limit fats and oils to 2 to 3 servings each day. A serving is 1 teaspoon of vegetable oil or 2 tablespoons of salad dressing. · Limit sweets and added sugars to 5 servings or less a week. A serving is 1 tablespoon jelly or jam, ½ cup sorbet, or 1 cup of lemonade. · Eat less than 2,300 milligrams (mg) of sodium a day. If you limit your sodium to 1,500 mg a day, you can lower your blood pressure even more. Tips for success  · Start small. Do not try to make dramatic changes to your diet all at once. You might feel that you are missing out on your favorite foods and then be more likely to not follow the plan. Make small changes, and stick with them. Once those changes become habit, add a few more changes. · Try some of the following:  ¨ Make it a goal to eat a fruit or vegetable at every meal and at snacks. This will make it easy to get the recommended amount of fruits and vegetables each day. ¨ Try yogurt topped with fruit and nuts for a snack or healthy dessert. ¨ Add lettuce, tomato, cucumber, and onion to sandwiches. ¨ Combine a ready-made pizza crust with low-fat mozzarella cheese and lots of vegetable toppings. Try using tomatoes, squash, spinach, broccoli, carrots, cauliflower, and onions. ¨ Have a variety of cut-up vegetables with a low-fat dip as an appetizer instead of chips and dip. ¨ Sprinkle sunflower seeds or chopped almonds over salads. Or try adding chopped walnuts or almonds to cooked vegetables. ¨ Try some vegetarian meals using beans and peas. Add garbanzo or kidney beans to salads. Make burritos and tacos with mashed medeiros beans or black beans. Where can you learn more? Go to http://zenia-alana.info/. Enter C199 in the search box to learn more about \"DASH Diet: Care Instructions. \"  Current as of: March 23, 2016  Content Version: 11.2  © 0861-9756 [employer]. Care instructions adapted under license by [employer] (which disclaims liability or warranty for this information). If you have questions about a medical condition or this instruction, always ask your healthcare professional. [place] [address] any warranty or liability for your use of this information. Schedule of Personalized Health Plan  (Provide Copy to Patient)  The best way to stay healthy is to live a healthy lifestyle. A healthy lifestyle includes regular exercise, eating a well-balanced diet, keeping a healthy weight and not smoking. Regular physical exams and screening tests are another important way to take care of yourself. Preventive exams provided by health care providers can find health problems early when treatment works best and can keep you from getting certain diseases or illnesses. Preventive services include exams, lab tests, screenings, shots, monitoring and information to help you take care of your own health. All people over 65 should have a pneumonia shot. Pneumonia shots are usually only needed once in a lifetime unless your doctor decides differently. All people over 65 should have a yearly flu shot. People over 65 are at medium to high risk for Hepatitis B. Three shots are needed for complete protection. In addition to your physical exam, some screening tests are recommended:    Bone mass measurement (dexa scan) is recommended every two years  Diabetes Mellitus screening is recommended every year. Glaucoma is an eye disease caused by high pressure in the eye.   An eye exam is recommended every year. Cardiovascular screening tests that check your cholesterol and other blood fat (lipid) levels are recommended every five years. Colorectal Cancer screening tests help to find pre-cancerous polyps (growths in the colon) so they can be removed before they turn into cancer. Tests ordered for screening depend on your personal and family history risk factors.     Screening for Breast Cancer is recommended yearly with a mammogram.    Screening for Cervical Cancer is recommended every two years (annually for certain risk factors, such as previous history of STD or abnormal PAP in past 7 years), with a Pelvic Exam with PAP    Here is a list of your current Health Maintenance items with a due date:  Health Maintenance   Topic Date Due    PAP AKA CERVICAL CYTOLOGY  02/10/1990    INFLUENZA AGE 9 TO ADULT  08/01/2017    DTaP/Tdap/Td series (2 - Td) 07/12/2026

## 2017-05-02 ENCOUNTER — OFFICE VISIT (OUTPATIENT)
Dept: PAIN MANAGEMENT | Age: 47
End: 2017-05-02

## 2017-05-02 VITALS
HEIGHT: 68 IN | BODY MASS INDEX: 42.28 KG/M2 | DIASTOLIC BLOOD PRESSURE: 99 MMHG | HEART RATE: 89 BPM | SYSTOLIC BLOOD PRESSURE: 185 MMHG | WEIGHT: 279 LBS

## 2017-05-02 DIAGNOSIS — M51.37 DEGENERATION OF LUMBAR OR LUMBOSACRAL INTERVERTEBRAL DISC: ICD-10-CM

## 2017-05-02 DIAGNOSIS — M46.1 SACROILIITIS (HCC): ICD-10-CM

## 2017-05-02 DIAGNOSIS — M47.817 LUMBOSACRAL SPONDYLOSIS WITHOUT MYELOPATHY: ICD-10-CM

## 2017-05-02 DIAGNOSIS — M54.17 LUMBOSACRAL RADICULOPATHY: Primary | ICD-10-CM

## 2017-05-02 DIAGNOSIS — Z79.899 ENCOUNTER FOR LONG-TERM (CURRENT) USE OF HIGH-RISK MEDICATION: ICD-10-CM

## 2017-05-02 DIAGNOSIS — G56.03 BILATERAL CARPAL TUNNEL SYNDROME: ICD-10-CM

## 2017-05-02 RX ORDER — HYDROMORPHONE HYDROCHLORIDE 4 MG/1
4 TABLET ORAL 4 TIMES DAILY
Qty: 120 TAB | Refills: 0 | Status: SHIPPED | OUTPATIENT
Start: 2017-05-02 | End: 2017-07-28 | Stop reason: SDUPTHER

## 2017-05-02 RX ORDER — METHADONE HYDROCHLORIDE 10 MG/1
20 TABLET ORAL EVERY 8 HOURS
Qty: 180 TAB | Refills: 0 | Status: SHIPPED | OUTPATIENT
Start: 2017-05-31 | End: 2017-07-28 | Stop reason: SDUPTHER

## 2017-05-02 RX ORDER — HYDROMORPHONE HYDROCHLORIDE 4 MG/1
4 TABLET ORAL 4 TIMES DAILY
Qty: 120 TAB | Refills: 0 | Status: SHIPPED | OUTPATIENT
Start: 2017-06-29 | End: 2017-07-29

## 2017-05-02 RX ORDER — HYDROMORPHONE HYDROCHLORIDE 4 MG/1
4 TABLET ORAL 4 TIMES DAILY
Qty: 120 TAB | Refills: 0 | Status: SHIPPED | OUTPATIENT
Start: 2017-05-31 | End: 2017-07-28 | Stop reason: SDUPTHER

## 2017-05-02 RX ORDER — METHADONE HYDROCHLORIDE 10 MG/1
20 TABLET ORAL EVERY 8 HOURS
Qty: 180 TAB | Refills: 0 | Status: SHIPPED | OUTPATIENT
Start: 2017-05-02 | End: 2017-07-28 | Stop reason: SDUPTHER

## 2017-05-02 RX ORDER — NALOXONE HYDROCHLORIDE 4 MG/.1ML
4 SPRAY NASAL AS NEEDED
Qty: 1 BOX | Refills: 1 | Status: SHIPPED | OUTPATIENT
Start: 2017-05-02

## 2017-05-02 RX ORDER — METHADONE HYDROCHLORIDE 10 MG/1
20 TABLET ORAL EVERY 8 HOURS
Qty: 180 TAB | Refills: 0 | Status: SHIPPED | OUTPATIENT
Start: 2017-06-29 | End: 2017-07-29

## 2017-05-02 NOTE — MR AVS SNAPSHOT
Visit Information Date & Time Provider Department Dept. Phone Encounter #  
 5/2/2017  1:00 PM Darlin Christina MD [facility] [phone] Follow-up Instructions Return in about 3 months (around 8/2/2017). Upcoming Health Maintenance Date Due  
 PAP AKA CERVICAL CYTOLOGY 2/10/1990 INFLUENZA AGE 9 TO ADULT 8/1/2017 DTaP/Tdap/Td series (2 - Td) 7/12/2026 Allergies as of 5/2/2017  Review Complete On: 5/2/2017 By: Darlin Christina MD  
  
 Severity Noted Reaction Type Reactions Bactrim [Sulfamethoprim Ds]  02/20/2015    Rash Current Immunizations  Reviewed on 12/28/2012 No immunizations on file. Not reviewed this visit You Were Diagnosed With   
  
 Codes Comments Bilateral carpal tunnel syndrome     ICD-10-CM: G56.03 
ICD-9-CM: 354.0 Lumbosacral radiculopathy     ICD-10-CM: M54.17 ICD-9-CM: 724.4 Lumbosacral spondylosis without myelopathy     ICD-10-CM: R73.776 ICD-9-CM: 721.3 Sacroiliitis ([facility] [address]     ICD-10-CM: M46.1 ICD-9-CM: 720.2 Degeneration of lumbar or lumbosacral intervertebral disc     ICD-10-CM: M51.37 
ICD-9-CM: 722.52 Encounter for long-term (current) use of high-risk medication     ICD-10-CM: Z79.899 ICD-9-CM: V58.69 Vitals BP Pulse Height(growth percentile) Weight(growth percentile) BMI OB Status (!) 185/99 89 5' 8\" (1.727 m) 279 lb (126.6 kg) 42.42 kg/m2 Postmenopausal  
 Smoking Status Former Smoker BMI and BSA Data Body Mass Index Body Surface Area  
 42.42 kg/m 2 2.46 m 2 Preferred Pharmacy Pharmacy Name Phone [facility] [address] [facility], [address] Your Updated Medication List  
  
   
This list is accurate as of: 5/2/17  1:21 PM.  Always use your most recent med list.  
  
  
  
  
 * HYDROmorphone 4 mg tablet Commonly known as:  DILAUDID  
 Take 1 Tab by mouth four (4) times daily for 30 days. Max Daily Amount: 16 mg. Prn severe pain  Indications: Pain  
  
 * HYDROmorphone 4 mg tablet Commonly known as:  DILAUDID Take 1 Tab by mouth four (4) times daily for 30 days. Max Daily Amount: 16 mg. Prn severe pain  Indications: Pain, Severe Pain Start taking on:  5/31/2017  
  
 * HYDROmorphone 4 mg tablet Commonly known as:  DILAUDID Take 1 Tab by mouth four (4) times daily for 30 days. Max Daily Amount: 16 mg. Prn severe pain  Indications: Pain, Severe Pain Start taking on:  6/29/2017  
  
 lisinopril 40 mg tablet Commonly known as:  PRINIVIL, ZESTRIL  
TAKE 1 TABLET BY MOUTH DAILY * methadone 10 mg tablet Commonly known as:  DOLOPHINE Take  by mouth every four (4) hours as needed for Pain. * methadone 10 mg tablet Commonly known as:  DOLOPHINE Take 2 Tabs by mouth every eight (8) hours for 30 days. For chronic pain. Indications: Chronic Pain, Severe Pain * methadone 10 mg tablet Commonly known as:  DOLOPHINE Take 2 Tabs by mouth every eight (8) hours for 30 days. For chronic pain. Indications: Chronic Pain, Severe Pain Start taking on:  5/31/2017 * methadone 10 mg tablet Commonly known as:  DOLOPHINE Take 2 Tabs by mouth every eight (8) hours for 30 days. For chronic pain. Indications: Chronic Pain, Severe Pain Start taking on:  6/29/2017  
  
 naloxone 4 mg/actuation Spry 4 mg by Nasal route as needed for up to 2 doses. Indications: OPIOID TOXICITY  
  
 OTHER Portable lightweight wheelchair * Notice: This list has 7 medication(s) that are the same as other medications prescribed for you. Read the directions carefully, and ask your doctor or other care provider to review them with you. Prescriptions Printed Refills  
 methadone (DOLOPHINE) 10 mg tablet 0 Starting on: 6/29/2017 Sig: Take 2 Tabs by mouth every eight (8) hours for 30 days.  For chronic pain.  Indications: Chronic Pain, Severe Pain Class: Print Route: Oral  
 methadone (DOLOPHINE) 10 mg tablet 0 Starting on: 2017 Sig: Take 2 Tabs by mouth every eight (8) hours for 30 days. For chronic pain. Indications: Chronic Pain, Severe Pain Class: Print Route: Oral  
 methadone (DOLOPHINE) 10 mg tablet 0 Sig: Take 2 Tabs by mouth every eight (8) hours for 30 days. For chronic pain. Indications: Chronic Pain, Severe Pain Class: Print Route: Oral  
 HYDROmorphone (DILAUDID) 4 mg tablet 0 Starting on: 2017 Sig: Take 1 Tab by mouth four (4) times daily for 30 days. Max Daily Amount: 16 mg. Prn severe pain  Indications: Pain, Severe Pain Class: Print Route: Oral  
 HYDROmorphone (DILAUDID) 4 mg tablet 0 Starting on: 2017 Sig: Take 1 Tab by mouth four (4) times daily for 30 days. Max Daily Amount: 16 mg. Prn severe pain  Indications: Pain, Severe Pain Class: Print Route: Oral  
 HYDROmorphone (DILAUDID) 4 mg tablet 0 Sig: Take 1 Tab by mouth four (4) times daily for 30 days. Max Daily Amount: 16 mg. Prn severe pain  Indications: Pain Class: Print Route: Oral  
  
Prescriptions Sent to Pharmacy Refills  
 naloxone 4 mg/actuation spry 1 Si mg by Nasal route as needed for up to 2 doses. Indications: OPIOID TOXICITY Class: Normal  
 Pharmacy: [id number] [facility] [facility], [address] [place] [address] #: [phone] Route: Nasal  
  
Follow-up Instructions Return in about 3 months (around 2017). Patient Instructions Current health maintenance issues were reviewed and the patient was advised to followup with his/her PCP for completion of these items. Introducing [facility]! Josr Joshua introduces [employer] patient portal. Now you can access parts of your medical record, email your doctor's office, and request medication refills online.    
 
1. In your internet browser, go to [email or web address] 2. Click on the First Time User? Click Here link in the Sign In box. You will see the New Member Sign Up page. 3. Enter your H[employer] Access Code exactly as it appears below. You will not need to use this code after youve completed the sign-up process. If you do not sign up before the expiration date, you must request a new code. · [employer] Access Code: [email or web address] Expires: 7/31/2017  1:21 PM 
 
4. Enter the last four digits of your Social Security Number (xxxx) and Date of Birth (mm/dd/yyyy) as indicated and click Submit. You will be taken to the next sign-up page. 5. Create a [employer] ID. This will be your Hotswap login ID and cannot be changed, so think of one that is secure and easy to remember. 6. Create a H[employer] password. You can change your password at any time. 7. Enter your Password Reset Question and Answer. This can be used at a later time if you forget your password. 8. Enter your e-mail address. You will receive e-mail notification when new information is available in [address] 9. Click Sign Up. You can now view and download portions of your medical record. 10. Click the Download Summary menu link to download a portable copy of your medical information. If you have questions, please visit the Frequently Asked Questions section of the [employer] website. Remember, [employer]ap is NOT to be used for urgent needs. For medical emergencies, dial 911. Now available from your iPhone and Android! Please provide this summary of care documentation to your next provider. Your primary care clinician is listed as Judge Destini Soares. If you have any questions after today's visit, please call [phone].

## 2017-05-02 NOTE — PROGRESS NOTES
Nursing Notes    Patient presents to the office today in follow-up. Patient rates her pain at 4/10 on the numerical pain scale. Reviewed medications with counts as follows:    Rx Date filled Qty Dispensed Pill Count Last Dose Short   Methadone 10 mg  04/04/17 180 10 today no   Dilaudid 4 mg  04/04/17 120 5 today no                          POC UDS was not performed in office today. Any new labs or imaging since last appointment? NO    Have you been to an emergency room (ER) or urgent care clinic since your last visit? YES. Pt states that she went to the ER to have an abscess looked at            Have you been hospitalized since your last visit? NO     If yes, where, when, and reason for visit? Have you seen or consulted any other health care providers outside of the [address] [place] [address]  since your last visit? NO     If yes, where, when, and reason for visit? HM deferred to pcp.

## 2017-05-03 NOTE — PROGRESS NOTES
HISTORY OF PRESENT ILLNESS  Subhash Capellan is a 49 y.o. female. HPI she returns for follow-up of chronic, severe pain involving the cervical and lumbar spine related to cervical and lumbar degenerative disc disease with spondylosis and radiculopathy. Pain remains under good control with 60-70% overall relief reported. Pain level today 4 out of 10, outcome 12/28,(The lower the upper number, the better the outcome)  Physical activity and mobility are fair, mood and sleep continue to be intermittently poor. No reported side effects. An EKG performed in February of this year was unremarkable. A current review of the  does not identify any inconsistency. Review of Systems   Constitutional: Positive for malaise/fatigue. Negative for chills and fever. Eyes: Negative for blurred vision and double vision. Respiratory: Negative for cough and shortness of breath. Cardiovascular: Negative for chest pain and leg swelling. Gastrointestinal: Negative for constipation. Musculoskeletal: Positive for back pain, joint pain, myalgias and neck pain. Negative for falls. Neurological: Positive for headaches. Endo/Heme/Allergies: Does not bruise/bleed easily. Psychiatric/Behavioral: Positive for depression. Negative for suicidal ideas. The patient is not nervous/anxious and does not have insomnia. All other systems reviewed and are negative. Physical Exam   Constitutional: She is oriented to person, place, and time. She appears well-developed and well-nourished. No distress. Overweight    HENT:   Head: Normocephalic. Eyes: Conjunctivae and EOM are normal. Pupils are equal, round, and reactive to light.   glasses   Neck: Normal range of motion. No thyromegaly present. Painful ROM   Pulmonary/Chest: Effort normal. No respiratory distress. Musculoskeletal: She exhibits tenderness (neck/lumbar). She exhibits no edema.         Right hip: She exhibits decreased range of motion and tenderness (over TB). Left hip: She exhibits tenderness (over TB). Cervical back: She exhibits decreased range of motion, tenderness, pain and spasm. Lumbar back: She exhibits decreased range of motion, tenderness, pain and spasm. Neurological: She is alert and oriented to person, place, and time. Gait (antalgic) abnormal.   Psychiatric: She has a normal mood and affect. Her behavior is normal. Judgment and thought content normal.   Nursing note and vitals reviewed. ASSESSMENT and PLAN  Encounter Diagnoses   Name Primary?  Lumbosacral radiculopathy Yes    Bilateral carpal tunnel syndrome     Lumbosacral spondylosis without myelopathy     Sacroiliitis (HCC)     Degeneration of lumbar or lumbosacral intervertebral disc     Encounter for long-term (current) use of high-risk medication      She will continue on her current analgesic regimen is providing excellent pain control with improve functionality and minimal side effects. Because the patient's current regimen places him/her at increased risk for possible overdose, a prescription for naloxone nasal spray is being provided. The patient understands that this medication is only to be used in the setting of a possible overdose and that inadvertent use of this medication could precipitate overt withdrawal.    3 month reassess her    No concerns are raised for misuse, abuse, or diversion. 1. Pain medications are prescribed with the objective of pain relief and improved physical and psychosocial function in this patient. 2. Counseled patient on proper use of prescribed medications and reviewed opioid contract. 3. Counseled patient about chronic medical conditions and their relationship to anxiety and depression and recommended mental health support as needed. 4. Reviewed with patient self-help tools, home exercise, and lifestyle changes to assist the patient in self-management of symptoms.   5. Advised patient to have a primary care provider to continue care for health maintenance and general medical conditions and support for referral to specialty care as needed. 6. Reviewed with patient the treatment plan, goals of treatment plan, and limitations of treatment plan, to include the potential for side effects from medications and procedures. If side effects occur, it is the responsibility of the patient to inform the clinic so that a change in the treatment plan can be made in a safe manner. The patient is advised that stopping prescribed medication may cause an increase in symptoms and possible medication withdrawal symptoms. The patient is informed an emergency room evaluation may be necessary if this occurs. DISPOSITION: The patients condition and plan were discussed at length and all questions were answered. The patient agrees with the plan.     Counseling occupied > 50% of visit:  Total time: 30 minutes

## 2017-05-11 PROBLEM — Z71.89 ADVANCE DIRECTIVE DISCUSSED WITH PATIENT: Status: ACTIVE | Noted: 2017-04-25

## 2017-05-11 PROBLEM — Z71.89 ADVANCE DIRECTIVE DISCUSSED WITH PATIENT: Status: ACTIVE | Noted: 2017-05-11

## 2017-07-11 ENCOUNTER — OFFICE VISIT (OUTPATIENT)
Dept: INTERNAL MEDICINE CLINIC | Age: 47
End: 2017-07-11

## 2017-07-11 VITALS
OXYGEN SATURATION: 97 % | DIASTOLIC BLOOD PRESSURE: 80 MMHG | TEMPERATURE: 98.5 F | RESPIRATION RATE: 20 BRPM | HEART RATE: 83 BPM | HEIGHT: 68 IN | SYSTOLIC BLOOD PRESSURE: 139 MMHG | WEIGHT: 281 LBS | BODY MASS INDEX: 42.59 KG/M2

## 2017-07-11 DIAGNOSIS — I10 ESSENTIAL HYPERTENSION: ICD-10-CM

## 2017-07-11 DIAGNOSIS — F33.41 RECURRENT MAJOR DEPRESSIVE DISORDER, IN PARTIAL REMISSION (HCC): Primary | ICD-10-CM

## 2017-07-11 RX ORDER — LISINOPRIL AND HYDROCHLOROTHIAZIDE 12.5; 2 MG/1; MG/1
2 TABLET ORAL DAILY
Qty: 60 TAB | Refills: 0 | Status: SHIPPED | OUTPATIENT
Start: 2017-07-11 | End: 2017-08-08 | Stop reason: SDUPTHER

## 2017-07-11 RX ORDER — ESCITALOPRAM OXALATE 10 MG/1
10 TABLET ORAL DAILY
Qty: 30 TAB | Refills: 0 | Status: SHIPPED | OUTPATIENT
Start: 2017-07-11 | End: 2017-08-02 | Stop reason: ALTCHOICE

## 2017-07-11 NOTE — PROGRESS NOTES
Tayler Mcgregor presents today at the clinic for     Chief Complaint   Patient presents with    Depression    Anxiety    Foot Swelling        Wt Readings from Last 3 Encounters:   07/11/17 281 lb (127.5 kg)   05/02/17 279 lb (126.6 kg)   04/25/17 279 lb (126.6 kg)     Temp Readings from Last 3 Encounters:   07/11/17 98.5 °F (36.9 °C) (Oral)   04/25/17 98.2 °F (36.8 °C) (Oral)   09/15/16 97.8 °F (36.6 °C) (Oral)     BP Readings from Last 3 Encounters:   07/11/17 139/80   05/02/17 (!) 185/99   04/25/17 138/83     Pulse Readings from Last 3 Encounters:   07/11/17 83   05/02/17 89   04/25/17 76       There are no preventive care reminders to display for this patient. Coordination of Care:    1. Have you been to the ER, urgent care clinic since your last visit? Hospitalized since your last visit? No    2. Have you seen or consulted any other health care providers outside of the [address] [place] [address] since your last visit? Include any pap smears or colon screening.  No

## 2017-07-11 NOTE — MR AVS SNAPSHOT
Visit Information Date & Time Provider Department Dept. Phone Encounter #  
 7/11/2017  2:30 PM Kojo Vazquez PA-C [facility] [phone] [id number] Follow-up Instructions Return in about 3 weeks (around 8/1/2017) for depression. Your Appointments 7/28/2017  1:00 PM  
Follow Up with Loida Jamison MD  
[facility] [address] [place] [address]) Appt Note: return in 3 months [address] [facility] [address] [phone] [facility] [id number] Upcoming Health Maintenance Date Due  
 PAP AKA CERVICAL CYTOLOGY 3/30/2018* INFLUENZA AGE 9 TO ADULT 8/1/2017 MEDICARE YEARLY EXAM 4/26/2018 DTaP/Tdap/Td series (2 - Td) 7/12/2026 *Topic was postponed. The date shown is not the original due date. Allergies as of 7/11/2017  Review Complete On: 7/11/2017 By: Guerda Kerns LPN Severity Noted Reaction Type Reactions Bactrim [Sulfamethoprim Ds]  02/20/2015    Rash Current Immunizations  Reviewed on 12/28/2012 No immunizations on file. Not reviewed this visit You Were Diagnosed With   
  
 Codes Comments Recurrent major depressive disorder, in partial remission ([facility]    -  Primary ICD-10-CM: F33.41 
ICD-9-CM: 296.35 Vitals BP Pulse Temp Resp Height(growth percentile) Weight(growth percentile) 139/80 (BP 1 Location: Left arm, BP Patient Position: Sitting) 83 98.5 °F (36.9 °C) (Oral) 20 5' 8\" (1.727 m) 281 lb (127.5 kg) SpO2 BMI OB Status Smoking Status 97% 42.73 kg/m2 Postmenopausal Former Smoker Vitals History BMI and BSA Data Body Mass Index Body Surface Area 42.73 kg/m 2 2.47 m 2 Preferred Pharmacy Pharmacy Name Phone [facility] [address] [address] [facility] [phone] Your Updated Medication List  
  
   
 This list is accurate as of: 7/11/17  2:56 PM.  Always use your most recent med list.  
  
  
  
  
 escitalopram oxalate 10 mg tablet Commonly known as:  Pamella Polio Take 1 Tab by mouth daily. Indications: ANXIETY WITH DEPRESSION  
  
 HYDROmorphone 4 mg tablet Commonly known as:  DILAUDID Take 1 Tab by mouth four (4) times daily for 30 days. Max Daily Amount: 16 mg. Prn severe pain  Indications: Pain, Severe Pain  
  
 lisinopril 40 mg tablet Commonly known as:  PRINIVIL, ZESTRIL  
TAKE 1 TABLET BY MOUTH DAILY * methadone 10 mg tablet Commonly known as:  DOLOPHINE Take  by mouth every four (4) hours as needed for Pain. * methadone 10 mg tablet Commonly known as:  DOLOPHINE Take 2 Tabs by mouth every eight (8) hours for 30 days. For chronic pain. Indications: Chronic Pain, Severe Pain  
  
 naloxone 4 mg/actuation Spry 4 mg by Nasal route as needed for up to 2 doses. Indications: OPIOID TOXICITY  
  
 OTHER Portable lightweight wheelchair * Notice: This list has 2 medication(s) that are the same as other medications prescribed for you. Read the directions carefully, and ask your doctor or other care provider to review them with you. Prescriptions Sent to Pharmacy Refills  
 escitalopram oxalate (LEXAPRO) 10 mg tablet 0 Sig: Take 1 Tab by mouth daily. Indications: ANXIETY WITH DEPRESSION Class: Normal  
 Pharmacy: [address] [place] [address] [facility] [address] [place] [address] #: [phone] Route: Oral  
  
Follow-up Instructions Return in about 3 weeks (around 8/1/2017) for depression. Patient Instructions Depression and Chronic Disease: Care Instructions Your Care Instructions A chronic disease is one that you have for a long time. Some chronic diseases can be controlled, but they usually cannot be cured. Depression is common in people with chronic diseases, but it often goes unnoticed. Many people have concerns about seeking treatment for a mental health problem. You may think it's a sign of weakness, or you don't want people to know about it. It's important to overcome these reasons for not seeking treatment. Treating depression or anxiety is good for your health. Follow-up care is a key part of your treatment and safety. Be sure to make and go to all appointments, and call your doctor if you are having problems. It's also a good idea to know your test results and keep a list of the medicines you take. How can you care for yourself at home? Watch for symptoms of depression The symptoms of depression are often subtle at first. You may think they are caused by your disease rather than depression. Or you may think it is normal to be depressed when you have a chronic disease. If you are depressed you may: · Feel sad or hopeless. · Feel guilty or worthless. · Not enjoy the things you used to enjoy. · Feel hopeless, as though life is not worth living. · Have trouble thinking or remembering. · Have low energy, and you may not eat or sleep well. · Pull away from others. · Think often about death or killing yourself. (Keep the numbers for these national suicide hotlines: 9-526-258-TALK [1-733.974.3884] and 9-111-AKAMQEI [1-973.669.2445]. ) Get treatment By treating your depression, you can feel more hopeful and have more energy. If you feel better, you may take better care of yourself, so your health may improve. · Talk to your doctor if you have any changes in mood during treatment for your disease. · Ask your doctor for help. Counseling, antidepressant medicine, or a combination of the two can help most people with depression. Often a combination works best. Counseling can also help you cope with having a chronic disease. When should you call for help? Call 911 anytime you think you may need emergency care. For example, call if: 
· You feel like hurting yourself or someone else. · Someone you know has depression and is about to attempt or is attempting suicide. Call your doctor now or seek immediate medical care if: 
· You hear voices. · Someone you know has depression and: 
¨ Starts to give away his or her possessions. ¨ Uses illegal drugs or drinks alcohol heavily. ¨ Talks or writes about death, including writing suicide notes or talking about guns, knives, or pills. ¨ Starts to spend a lot of time alone. ¨ Acts very aggressively or suddenly appears calm. Watch closely for changes in your health, and be sure to contact your doctor if: 
· You do not get better as expected. Where can you learn more? Go to http://zenia-alana.info/. Enter N939 in the search box to learn more about \"Depression and Chronic Disease: Care Instructions. \" Current as of: July 26, 2016 Content Version: 11.3 © 7042-7468 [employer]. Care instructions adapted under license by [employer] (which disclaims liability or warranty for this information). If you have questions about a medical condition or this instruction, always ask your healthcare professional. [address] any warranty or liability for your use of this information. Introducing [facility]! [employer] introduces [employer] patient portal. Now you can access parts of your medical record, email your doctor's office, and request medication refills online. 1. In your internet browser, go to [email or web address] 2. Click on the First Time User? Click Here link in the Sign In box. You will see the New Member Sign Up page. 3. Enter your [employer] Access Code exactly as it appears below. You will not need to use this code after youve completed the sign-up process. If you do not sign up before the expiration date, you must request a new code. · [employer] Access Code: [email or web address] Expires: 7/31/2017  1:21 PM 
 
 4. Enter the last four digits of your Social Security Number (xxxx) and Date of Birth (mm/dd/yyyy) as indicated and click Submit. You will be taken to the next sign-up page. 5. Create a Sha-Sha ID. This will be your [employer] login ID and cannot be changed, so think of one that is secure and easy to remember. 6. Create a Sha-Sha password. You can change your password at any time. 7. Enter your Password Reset Question and Answer. This can be used at a later time if you forget your password. 8. Enter your e-mail address. You will receive e-mail notification when new information is available in [address] 9. Click Sign Up. You can now view and download portions of your medical record. 10. Click the Download Summary menu link to download a portable copy of your medical information. If you have questions, please visit the Frequently Asked Questions section of the [employer] website. Remember, [employer] is NOT to be used for urgent needs. For medical emergencies, dial 911. Now available from your iPhone and Android! Please provide this summary of care documentation to your next provider. Your primary care clinician is listed as Zion Soares. If you have any questions after today's visit, please call [phone].

## 2017-07-11 NOTE — PATIENT INSTRUCTIONS
Depression and Chronic Disease: Care Instructions  Your Care Instructions  A chronic disease is one that you have for a long time. Some chronic diseases can be controlled, but they usually cannot be cured. Depression is common in people with chronic diseases, but it often goes unnoticed. Many people have concerns about seeking treatment for a mental health problem. You may think it's a sign of weakness, or you don't want people to know about it. It's important to overcome these reasons for not seeking treatment. Treating depression or anxiety is good for your health. Follow-up care is a key part of your treatment and safety. Be sure to make and go to all appointments, and call your doctor if you are having problems. It's also a good idea to know your test results and keep a list of the medicines you take. How can you care for yourself at home? Watch for symptoms of depression  The symptoms of depression are often subtle at first. You may think they are caused by your disease rather than depression. Or you may think it is normal to be depressed when you have a chronic disease. If you are depressed you may:  · Feel sad or hopeless. · Feel guilty or worthless. · Not enjoy the things you used to enjoy. · Feel hopeless, as though life is not worth living. · Have trouble thinking or remembering. · Have low energy, and you may not eat or sleep well. · Pull away from others. · Think often about death or killing yourself. (Keep the numbers for these national suicide hotlines: 9-318-946-TALK [1-231.840.2746] and 0-494-JMHXBDG [1-320.804.2432]. )  Get treatment  By treating your depression, you can feel more hopeful and have more energy. If you feel better, you may take better care of yourself, so your health may improve. · Talk to your doctor if you have any changes in mood during treatment for your disease. · Ask your doctor for help.  Counseling, antidepressant medicine, or a combination of the two can help most people with depression. Often a combination works best. Counseling can also help you cope with having a chronic disease. When should you call for help? Call 911 anytime you think you may need emergency care. For example, call if:  · You feel like hurting yourself or someone else. · Someone you know has depression and is about to attempt or is attempting suicide. Call your doctor now or seek immediate medical care if:  · You hear voices. · Someone you know has depression and:  ¨ Starts to give away his or her possessions. ¨ Uses illegal drugs or drinks alcohol heavily. ¨ Talks or writes about death, including writing suicide notes or talking about guns, knives, or pills. ¨ Starts to spend a lot of time alone. ¨ Acts very aggressively or suddenly appears calm. Watch closely for changes in your health, and be sure to contact your doctor if:  · You do not get better as expected. Where can you learn more? Go to http://zenia-alana.info/. Enter L238 in the search box to learn more about \"Depression and Chronic Disease: Care Instructions. \"  Current as of: July 26, 2016  Content Version: 11.3  © 2153-2840 [employer]. Care instructions adapted under license by [employer] (which disclaims liability or warranty for this information). If you have questions about a medical condition or this instruction, always ask your healthcare professional. [address] any warranty or liability for your use of this information.

## 2017-07-17 NOTE — PROGRESS NOTES
HISTORY OF PRESENT ILLNESS  Sonal Mesa is a 49 y.o. female. HPI   Pt is here for depression/anxiety. Pt was on meds years ago for this however came off and was doing ok until her  was recently dx with DM2 and now she is feeling down and very worried over his medical condition. She does not want a benzo but would like to go on something long term for this. She also has noticed a slight increase in her BP as well. She was previously on hctz and was wondering about adding this to her lisinopril. Denies CP, SOB, palpitations, edema, and thoughts of harming herself or anyone else. Allergies   Allergen Reactions    Bactrim [Sulfamethoprim Ds] Rash       Current Outpatient Prescriptions   Medication Sig    escitalopram oxalate (LEXAPRO) 10 mg tablet Take 1 Tab by mouth daily. Indications: ANXIETY WITH DEPRESSION    lisinopril-hydroCHLOROthiazide (PRINZIDE, ZESTORETIC) 20-12.5 mg per tablet Take 2 Tabs by mouth daily.  naloxone 4 mg/actuation spry 4 mg by Nasal route as needed for up to 2 doses. Indications: OPIOID TOXICITY    methadone (DOLOPHINE) 10 mg tablet Take 2 Tabs by mouth every eight (8) hours for 30 days. For chronic pain. Indications: Chronic Pain, Severe Pain    HYDROmorphone (DILAUDID) 4 mg tablet Take 1 Tab by mouth four (4) times daily for 30 days. Max Daily Amount: 16 mg. Prn severe pain  Indications: Pain, Severe Pain    methadone (DOLOPHINE) 10 mg tablet Take  by mouth every four (4) hours as needed for Pain.  OTHER Portable lightweight wheelchair     No current facility-administered medications for this visit. Review of Systems   Constitutional: Negative. Negative for chills, fever and malaise/fatigue. HENT: Negative. Negative for congestion, ear pain, sore throat and tinnitus. Eyes: Negative. Negative for blurred vision, double vision and photophobia. Respiratory: Negative. Negative for cough, shortness of breath and wheezing. Cardiovascular: Negative. Negative for chest pain, palpitations and leg swelling. Gastrointestinal: Negative. Negative for abdominal pain, heartburn, nausea and vomiting. Genitourinary: Negative. Negative for dysuria, frequency, hematuria and urgency. Musculoskeletal: Negative. Negative for back pain, joint pain, myalgias and neck pain. Skin: Negative. Negative for itching and rash. Neurological: Negative. Negative for dizziness, tingling, tremors and headaches. Psychiatric/Behavioral: Positive for depression. Negative for memory loss, substance abuse and suicidal ideas. The patient is nervous/anxious. The patient does not have insomnia. Visit Vitals    /80 (BP 1 Location: Left arm, BP Patient Position: Sitting)    Pulse 83    Temp 98.5 °F (36.9 °C) (Oral)    Resp 20    Ht 5' 8\" (1.727 m)    Wt 281 lb (127.5 kg)    SpO2 97%    BMI 42.73 kg/m2       Physical Exam   Constitutional: She is oriented to person, place, and time. No distress. Pt is obese and has very strong odor   HENT:   Head: Normocephalic and atraumatic. Eyes: EOM are normal.   Neck: Normal range of motion. Neck supple. Cardiovascular: Normal rate, regular rhythm and normal heart sounds. Exam reveals no gallop and no friction rub. No murmur heard. Pulmonary/Chest: Effort normal and breath sounds normal. She has no wheezes. Abdominal: Soft. Bowel sounds are normal.   Neurological: She is alert and oriented to person, place, and time. She has normal reflexes. Skin: Skin is warm and dry. She is not diaphoretic. Psychiatric: Her speech is normal and behavior is normal. Thought content normal. Her mood appears anxious. Thought content is not paranoid. She exhibits a depressed mood. She expresses no homicidal and no suicidal ideation. ASSESSMENT and PLAN    ICD-10-CM ICD-9-CM    1. Recurrent major depressive disorder, in partial [facility] F33.41 296.35    2.  Essential hypertension I10 401.9 lisinopril-hydroCHLOROthiazide ([employer]) 20-12.5 mg per tablet     Follow-up Disposition:  Return in about 3 weeks (around 8/1/2017) for depression. Pt expressed understanding of visit summary and plans for any follow ups or referrals as well as any medications prescribed.

## 2017-07-28 ENCOUNTER — OFFICE VISIT (OUTPATIENT)
Dept: PAIN MANAGEMENT | Age: 47
End: 2017-07-28

## 2017-07-28 VITALS
SYSTOLIC BLOOD PRESSURE: 101 MMHG | RESPIRATION RATE: 18 BRPM | BODY MASS INDEX: 42.73 KG/M2 | TEMPERATURE: 98 F | DIASTOLIC BLOOD PRESSURE: 63 MMHG | WEIGHT: 281 LBS | HEART RATE: 81 BPM

## 2017-07-28 DIAGNOSIS — M46.1 SACROILIITIS (HCC): ICD-10-CM

## 2017-07-28 DIAGNOSIS — M51.37 DEGENERATION OF LUMBAR OR LUMBOSACRAL INTERVERTEBRAL DISC: ICD-10-CM

## 2017-07-28 DIAGNOSIS — G56.03 BILATERAL CARPAL TUNNEL SYNDROME: ICD-10-CM

## 2017-07-28 DIAGNOSIS — M54.17 LUMBOSACRAL RADICULOPATHY: Primary | ICD-10-CM

## 2017-07-28 DIAGNOSIS — M47.817 LUMBOSACRAL SPONDYLOSIS WITHOUT MYELOPATHY: ICD-10-CM

## 2017-07-28 DIAGNOSIS — Z79.899 ENCOUNTER FOR LONG-TERM (CURRENT) USE OF HIGH-RISK MEDICATION: ICD-10-CM

## 2017-07-28 LAB
ALCOHOL UR POC: NORMAL
AMPHETAMINES UR POC: NEGATIVE
BARBITURATES UR POC: NEGATIVE
BENZODIAZEPINES UR POC: NEGATIVE
BUPRENORPHINE UR POC: NORMAL
CANNABINOIDS UR POC: NEGATIVE
CARISOPRODOL UR POC: NORMAL
COCAINE UR POC: NEGATIVE
FENTANYL UR POC: NORMAL
MDMA/ECSTASY UR POC: NEGATIVE
METHADONE UR POC: NORMAL
METHAMPHETAMINE UR POC: NEGATIVE
METHYLPHENIDATE UR POC: NEGATIVE
OPIATES UR POC: NORMAL
OXYCODONE UR POC: NEGATIVE
PHENCYCLIDINE UR POC: NEGATIVE
PROPOXYPHENE UR POC: NORMAL
TRAMADOL UR POC: NORMAL
TRICYCLICS UR POC: NEGATIVE

## 2017-07-28 RX ORDER — HYDROMORPHONE HYDROCHLORIDE 4 MG/1
4 TABLET ORAL 4 TIMES DAILY
Qty: 120 TAB | Refills: 0 | Status: SHIPPED | OUTPATIENT
Start: 2017-07-28 | End: 2017-08-27

## 2017-07-28 RX ORDER — HYDROMORPHONE HYDROCHLORIDE 4 MG/1
4 TABLET ORAL 4 TIMES DAILY
Qty: 120 TAB | Refills: 0 | Status: SHIPPED | OUTPATIENT
Start: 2017-09-24 | End: 2017-10-19 | Stop reason: SDUPTHER

## 2017-07-28 RX ORDER — METHADONE HYDROCHLORIDE 10 MG/1
20 TABLET ORAL EVERY 8 HOURS
Qty: 180 TAB | Refills: 0 | Status: SHIPPED | OUTPATIENT
Start: 2017-07-28 | End: 2017-10-19 | Stop reason: SDUPTHER

## 2017-07-28 RX ORDER — METHADONE HYDROCHLORIDE 10 MG/1
20 TABLET ORAL EVERY 8 HOURS
Qty: 180 TAB | Refills: 0 | Status: SHIPPED | OUTPATIENT
Start: 2017-08-26 | End: 2017-09-25

## 2017-07-28 RX ORDER — HYDROMORPHONE HYDROCHLORIDE 4 MG/1
4 TABLET ORAL 4 TIMES DAILY
Qty: 120 TAB | Refills: 0 | Status: SHIPPED | OUTPATIENT
Start: 2017-08-26 | End: 2017-09-25

## 2017-07-28 RX ORDER — METHADONE HYDROCHLORIDE 10 MG/1
20 TABLET ORAL EVERY 8 HOURS
Qty: 180 TAB | Refills: 0 | Status: SHIPPED | OUTPATIENT
Start: 2017-09-24 | End: 2017-10-24

## 2017-07-28 NOTE — PROGRESS NOTES
Nursing Notes    Patient presents to the office today in follow-up. Patient rates her pain at 7/10 on the numerical pain scale. Reviewed medications with counts as follows:    Rx Date filled Qty Dispensed Pill Count Last Dose Short   Methadone 10 6/29/17 180 2 7/28/17 no   Dilaudid 4 6/29/17 120 1 7/28/17 no         Comments:     POC UDS was performed in office today    Any new labs or imaging since last appointment? NO    Have you been to an emergency room (ER) or urgent care clinic since your last visit? Yes, abcess draining           Have you been hospitalized since your last visit? NO     If yes, where, when, and reason for visit? Have you seen or consulted any other health care providers outside of the [address] [place] [address]  since your last visit? Yes, [facility]   If yes, where, when, and reason for visit? Ms. Shahnaz Conn has a reminder for a \"due or due soon\" health maintenance. I have asked that she contact her primary care provider for follow-up on this health maintenance.

## 2017-07-28 NOTE — PROGRESS NOTES
HISTORY OF PRESENT ILLNESS  Vijay Bhatt is a 49 y.o. female. HPI she returns for follow-up of chronic, severe pain involving the cervical and lumbar spine related to cervical and lumbar degenerative disc disease with spondylosis and radiculopathy. Since last seen, she has been experiencing increasing low back pain with radiation down both lower extremities associated with numbness in both feet. Her most recent MRI of the lumbar spine was performed in 2010 and was reviewed with results as follows:    1. Severe right neuroforaminal stenosis and associated distortion of the transiting right L5 nerve root at L5-S1 due to broad right posterolateral/lateral disc protrusion. Mild deformity of the right ventral dural sac. 2.  Posterior disc protrusion at L4-5  3. Disc bulges at L3-4 and L2-3  4. Right posterolateral disc protrusion/osteophyte complex at T11-12 and left posterolateral disc protrusion at T10-11. In light of her increasing symptoms, a repeat MRI of the lumbar spine will be arranged. Pain level today 7 out of 10, outcome 14/28,(The lower the upper number, the better the outcome)  Physical activity and mobility as well as mood are fair, sleep is poor. No reported side effects. A current review of the  does not identify any inconsistency. UDS obtained and reviewed; formal confirmation from laboratory is pending. Review of Systems   Constitutional: Positive for malaise/fatigue. Negative for chills and fever. Eyes: Negative for blurred vision and double vision. Respiratory: Negative for cough and shortness of breath. Cardiovascular: Negative for chest pain and leg swelling. Gastrointestinal: Negative for constipation. Musculoskeletal: Positive for back pain, joint pain, myalgias and neck pain. Negative for falls. Neurological: Positive for headaches. Endo/Heme/Allergies: Does not bruise/bleed easily. Psychiatric/Behavioral: Positive for depression.  Negative for suicidal ideas. The patient is not nervous/anxious and does not have insomnia. All other systems reviewed and are negative. Physical Exam   Constitutional: She is oriented to person, place, and time. She appears well-developed and well-nourished. She appears distressed. Overweight    HENT:   Head: Normocephalic. Eyes: Conjunctivae and EOM are normal. Pupils are equal, round, and reactive to light.   glasses   Neck: Normal range of motion. No thyromegaly present. Painful ROM   Pulmonary/Chest: Effort normal. No respiratory distress. Musculoskeletal: She exhibits tenderness (neck/lumbar). She exhibits no edema. Right hip: She exhibits decreased range of motion and tenderness (over TB). Left hip: She exhibits tenderness (over TB). Cervical back: She exhibits decreased range of motion, tenderness, pain and spasm. Lumbar back: She exhibits decreased range of motion, tenderness, pain and spasm. Neurological: She is alert and oriented to person, place, and time. Gait (antalgic) abnormal.   Psychiatric: She has a normal mood and affect. Her behavior is normal. Judgment and thought content normal.   Nursing note and vitals reviewed. ASSESSMENT and PLAN  Encounter Diagnoses   Name Primary?  Lumbosacral radiculopathy Yes    Bilateral carpal tunnel syndrome     Lumbosacral spondylosis without myelopathy     Sacroiliitis (HCC)     Degeneration of lumbar or lumbosacral intervertebral disc     Encounter for long-term (current) use of high-risk medication      Treatment plan as noted above. She will continue on her current analgesic regimen as this is providing good pain control with improved functionality and minimal side effects. Further recommendations will be based upon the results of the MRI. No concerns are raised for misuse, abuse, or diversion.     1. Pain medications are prescribed with the objective of pain relief and improved physical and psychosocial function in this patient. 2. Counseled patient on proper use of prescribed medications and reviewed opioid contract. 3. Counseled patient about chronic medical conditions and their relationship to anxiety and depression and recommended mental health support as needed. 4. Reviewed with patient self-help tools, home exercise, and lifestyle changes to assist the patient in self-management of symptoms. 5. Advised patient to have a primary care provider to continue care for health maintenance and general medical conditions and support for referral to specialty care as needed. 6. Reviewed with patient the treatment plan, goals of treatment plan, and limitations of treatment plan, to include the potential for side effects from medications and procedures. If side effects occur, it is the responsibility of the patient to inform the clinic so that a change in the treatment plan can be made in a safe manner. The patient is advised that stopping prescribed medication may cause an increase in symptoms and possible medication withdrawal symptoms. The patient is informed an emergency room evaluation may be necessary if this occurs. DISPOSITION: The patients condition and plan were discussed at length and all questions were answered. The patient agrees with the plan.     Counseling occupied > 50% of visit:  Total time: 40 minutes

## 2017-07-28 NOTE — MR AVS SNAPSHOT
Visit Information Date & Time Provider Department Dept. Phone Encounter #  
 7/28/2017  1:00 PM Lala Avila MD [address] [place] [address] Pain Management  [id number] Follow-up Instructions Return in about 3 months (around 10/28/2017). Your Appointments 8/1/2017  2:30 PM  
Office Visit with Cristian Patel PA-C [facility]) Appt Note: OV 3 wk f/u for depression/anxiety [phone] [facility] [address]  
[phone]  
  
   
 [phone] [facility] [address] Upcoming Health Maintenance Date Due  
 PAP AKA CERVICAL CYTOLOGY 3/30/2018* INFLUENZA AGE 9 TO ADULT 8/1/2017 MEDICARE YEARLY EXAM 4/26/2018 DTaP/Tdap/Td series (2 - Td) 7/12/2026 *Topic was postponed. The date shown is not the original due date. Allergies as of 7/28/2017  Review Complete On: 7/28/2017 By: Lala Avila MD  
  
 Severity Noted Reaction Type Reactions Bactrim [Sulfamethoprim Ds]  02/20/2015    Rash Current Immunizations  Reviewed on 12/28/2012 No immunizations on file. Not reviewed this visit You Were Diagnosed With   
  
 Codes Comments Bilateral carpal tunnel syndrome     ICD-10-CM: G56.03 
ICD-9-CM: 354.0 Lumbosacral radiculopathy     ICD-10-CM: M54.17 ICD-9-CM: 724.4 Lumbosacral spondylosis without myelopathy     ICD-10-CM: B65.686 ICD-9-CM: 721.3 Sacroiliitis ([facility] [address]     ICD-10-CM: M46.1 ICD-9-CM: 720.2 Degeneration of lumbar or lumbosacral intervertebral disc     ICD-10-CM: M51.37 
ICD-9-CM: 722.52 Encounter for long-term (current) use of high-risk medication     ICD-10-CM: Z79.899 ICD-9-CM: V58.69 Vitals BP Pulse Temp Resp Weight(growth percentile) BMI  
 101/63 81 98 °F (36.7 °C) 18 281 lb (127.5 kg) 42.73 kg/m2 OB Status Smoking Status Postmenopausal Former Smoker BMI and BSA Data Body Mass Index Body Surface Area 42.73 kg/m 2 2.47 m 2 Preferred Pharmacy Pharmacy Name Phone [facility] [address] [address] [place] [facility] [phone] Your Updated Medication List  
  
   
This list is accurate as of: 7/28/17  1:42 PM.  Always use your most recent med list.  
  
  
  
  
 escitalopram oxalate 10 mg tablet Commonly known as:  Joshua Durand Take 1 Tab by mouth daily. Indications: ANXIETY WITH DEPRESSION  
  
 * HYDROmorphone 4 mg tablet Commonly known as:  DILAUDID Take 1 Tab by mouth four (4) times daily for 30 days. Max Daily Amount: 16 mg. Prn severe pain  Indications: Pain, Severe Pain  
  
 * HYDROmorphone 4 mg tablet Commonly known as:  DILAUDID Take 1 Tab by mouth four (4) times daily for 30 days. Max Daily Amount: 16 mg. Prn severe pain  Indications: Pain  
  
 * HYDROmorphone 4 mg tablet Commonly known as:  DILAUDID Take 1 Tab by mouth four (4) times daily for 30 days. Max Daily Amount: 16 mg. Prn severe pain  Indications: Pain Start taking on:  8/26/2017  
  
 * HYDROmorphone 4 mg tablet Commonly known as:  DILAUDID Take 1 Tab by mouth four (4) times daily for 30 days. Max Daily Amount: 16 mg. Prn severe pain  Indications: Pain, Severe Pain Start taking on:  9/24/2017  
  
 lisinopril-hydroCHLOROthiazide 20-12.5 mg per tablet Commonly known as:  Pioche Done Take 2 Tabs by mouth daily. * methadone 10 mg tablet Commonly known as:  DOLOPHINE Take 2 Tabs by mouth every eight (8) hours for 30 days. For chronic pain. Indications: Chronic Pain, Severe Pain * methadone 10 mg tablet Commonly known as:  DOLOPHINE Take 2 Tabs by mouth every eight (8) hours for 30 days. For chronic pain. Indications: Chronic Pain, Severe Pain * methadone 10 mg tablet Commonly known as:  DOLOPHINE Take 2 Tabs by mouth every eight (8) hours for 30 days. For chronic pain. Indications: Chronic Pain, Severe Pain Start taking on:  8/26/2017 * methadone 10 mg tablet Commonly known as:  DOLOPHINE Take 2 Tabs by mouth every eight (8) hours for 30 days. For chronic pain. Indications: Chronic Pain, Severe Pain Start taking on:  9/24/2017  
  
 naloxone 4 mg/actuation Spry 4 mg by Nasal route as needed for up to 2 doses. Indications: OPIOID TOXICITY  
  
 OTHER Portable lightweight wheelchair * Notice: This list has 8 medication(s) that are the same as other medications prescribed for you. Read the directions carefully, and ask your doctor or other care provider to review them with you. Prescriptions Printed Refills  
 methadone (DOLOPHINE) 10 mg tablet 0 Starting on: 9/24/2017 Sig: Take 2 Tabs by mouth every eight (8) hours for 30 days. For chronic pain. Indications: Chronic Pain, Severe Pain Class: Print Route: Oral  
 methadone (DOLOPHINE) 10 mg tablet 0 Starting on: 8/26/2017 Sig: Take 2 Tabs by mouth every eight (8) hours for 30 days. For chronic pain. Indications: Chronic Pain, Severe Pain Class: Print Route: Oral  
 methadone (DOLOPHINE) 10 mg tablet 0 Sig: Take 2 Tabs by mouth every eight (8) hours for 30 days. For chronic pain. Indications: Chronic Pain, Severe Pain Class: Print Route: Oral  
 HYDROmorphone (DILAUDID) 4 mg tablet 0 Starting on: 9/24/2017 Sig: Take 1 Tab by mouth four (4) times daily for 30 days. Max Daily Amount: 16 mg. Prn severe pain  Indications: Pain, Severe Pain Class: Print Route: Oral  
 HYDROmorphone (DILAUDID) 4 mg tablet 0 Starting on: 8/26/2017 Sig: Take 1 Tab by mouth four (4) times daily for 30 days. Max Daily Amount: 16 mg. Prn severe pain  Indications: Pain Class: Print Route: Oral  
 HYDROmorphone (DILAUDID) 4 mg tablet 0 Sig: Take 1 Tab by mouth four (4) times daily for 30 days. Max Daily Amount: 16 mg. Prn severe pain  Indications: Pain Class: Print Route: Oral  
  
Follow-up Instructions Return in about 3 months (around 10/28/2017). To-Do List   
 07/28/2017 Imaging:  MRI LUMB SPINE WO CONT Referral Information Referral ID Referred By Referred To  
  
 [id number] Leida Mathias Not Available Visits Status Start Date End Date 1 New Request 7/28/17 7/28/18 If your referral has a status of pending review or denied, additional information will be sent to support the outcome of this decision. Patient Instructions Current health maintenance issues were reviewed and the patient was advised to followup with his/her PCP for completion of these items. Introducing [facility]! Kalli Bolaños introduces [employer] patient portal. Now you can access parts of your medical record, email your doctor's office, and request medication refills online. 1. In your internet browser, go to [email or web address] 2. Click on the First Time User? Click Here link in the Sign In box. You will see the New Member Sign Up page. 3. Enter your [employer] Access Code exactly as it appears below. You will not need to use this code after youve completed the sign-up process. If you do not sign up before the expiration date, you must request a new code. · [employer] Access Code: [email or web address] Expires: 7/31/2017  1:21 PM 
 
4. Enter the last four digits of your Social Security Number (xxxx) and Date of Birth (mm/dd/yyyy) as indicated and click Submit. You will be taken to the next sign-up page. 5. Create a [employer] ID. This will be your [facility] [employer] login ID and cannot be changed, so think of one that is secure and easy to remember. 6. Create a [facility] [employer] password. You can change your password at any time. 7. Enter your Password Reset Question and Answer. This can be used at a later time if you forget your password. 8. Enter your e-mail address.  You will receive e-mail notification when new information is available in [facility]. 9. Click Sign Up. You can now view and download portions of your medical record. 10. Click the Download Summary menu link to download a portable copy of your medical information. If you have questions, please visit the Frequently Asked Questions section of the [facility] website. Remember, [facility] is NOT to be used for urgent needs. For medical emergencies, dial 911. Now available from your iPhone and Android! Please provide this summary of care documentation to your next provider. Your primary care clinician is listed as Haley Soraes. If you have any questions after today's visit, please call [phone].

## 2017-08-02 ENCOUNTER — OFFICE VISIT (OUTPATIENT)
Dept: INTERNAL MEDICINE CLINIC | Age: 47
End: 2017-08-02

## 2017-08-02 VITALS
SYSTOLIC BLOOD PRESSURE: 117 MMHG | HEART RATE: 83 BPM | DIASTOLIC BLOOD PRESSURE: 80 MMHG | HEIGHT: 68 IN | TEMPERATURE: 98 F | RESPIRATION RATE: 18 BRPM | WEIGHT: 276 LBS | OXYGEN SATURATION: 95 % | BODY MASS INDEX: 41.83 KG/M2

## 2017-08-02 DIAGNOSIS — F33.41 RECURRENT MAJOR DEPRESSIVE DISORDER, IN PARTIAL REMISSION (HCC): Primary | ICD-10-CM

## 2017-08-02 RX ORDER — ESCITALOPRAM OXALATE 20 MG/1
20 TABLET ORAL DAILY
Qty: 30 TAB | Refills: 0 | Status: SHIPPED | OUTPATIENT
Start: 2017-08-02 | End: 2017-08-29 | Stop reason: SDUPTHER

## 2017-08-02 NOTE — PROGRESS NOTES
HISTORY OF PRESENT ILLNESS  Tayler Mcgregor is a 49 y.o. female. HPI   Pt is here for f/u on her depression and anxiety. She feels the lexapro is helping but that she needs to go to the higher dose. She denies any side effects and states she feels much better,     Allergies   Allergen Reactions    Bactrim [Sulfamethoprim Ds] Rash       Current Outpatient Prescriptions   Medication Sig    escitalopram oxalate (LEXAPRO) 20 mg tablet Take 1 Tab by mouth daily.  [START ON 9/24/2017] methadone (DOLOPHINE) 10 mg tablet Take 2 Tabs by mouth every eight (8) hours for 30 days. For chronic pain. Indications: Chronic Pain, Severe Pain    [START ON 8/26/2017] methadone (DOLOPHINE) 10 mg tablet Take 2 Tabs by mouth every eight (8) hours for 30 days. For chronic pain. Indications: Chronic Pain, Severe Pain    methadone (DOLOPHINE) 10 mg tablet Take 2 Tabs by mouth every eight (8) hours for 30 days. For chronic pain. Indications: Chronic Pain, Severe Pain    [START ON 9/24/2017] HYDROmorphone (DILAUDID) 4 mg tablet Take 1 Tab by mouth four (4) times daily for 30 days. Max Daily Amount: 16 mg. Prn severe pain  Indications: Pain, Severe Pain    [START ON 8/26/2017] HYDROmorphone (DILAUDID) 4 mg tablet Take 1 Tab by mouth four (4) times daily for 30 days. Max Daily Amount: 16 mg. Prn severe pain  Indications: Pain    HYDROmorphone (DILAUDID) 4 mg tablet Take 1 Tab by mouth four (4) times daily for 30 days. Max Daily Amount: 16 mg. Prn severe pain  Indications: Pain    lisinopril-hydroCHLOROthiazide (PRINZIDE, ZESTORETIC) 20-12.5 mg per tablet Take 2 Tabs by mouth daily.  naloxone 4 mg/actuation spry 4 mg by Nasal route as needed for up to 2 doses. Indications: OPIOID TOXICITY    OTHER Portable lightweight wheelchair     No current facility-administered medications for this visit. Review of Systems   Constitutional: Negative. Negative for chills, fever and malaise/fatigue. HENT: Negative.   Negative for congestion, ear pain, sore throat and tinnitus. Eyes: Negative. Negative for blurred vision, double vision and photophobia. Respiratory: Negative. Negative for cough, shortness of breath and wheezing. Cardiovascular: Negative. Negative for chest pain, palpitations and leg swelling. Gastrointestinal: Negative. Negative for abdominal pain, heartburn, nausea and vomiting. Genitourinary: Negative. Negative for dysuria, frequency, hematuria and urgency. Musculoskeletal: Negative. Negative for back pain, joint pain, myalgias and neck pain. Skin: Negative. Negative for itching and rash. Neurological: Negative. Negative for dizziness, tingling, tremors and headaches. Psychiatric/Behavioral: Positive for depression (improved). Negative for memory loss, substance abuse and suicidal ideas. The patient is nervous/anxious (improved). The patient does not have insomnia. Visit Vitals    /80 (BP 1 Location: Left arm, BP Patient Position: Sitting)    Pulse 83    Temp 98 °F (36.7 °C) (Oral)    Resp 18    Ht 5' 8\" (1.727 m)    Wt 276 lb (125.2 kg)    SpO2 95%    BMI 41.97 kg/m2       Physical Exam   Constitutional: She is oriented to person, place, and time. No distress. HENT:   Head: Normocephalic and atraumatic. Eyes: EOM are normal.   Neck: Normal range of motion. Neck supple. Cardiovascular: Normal rate, regular rhythm and normal heart sounds. Exam reveals no gallop and no friction rub. No murmur heard. Pulmonary/Chest: Effort normal and breath sounds normal. She has no wheezes. Abdominal: Soft. Bowel sounds are normal.   Neurological: She is alert and oriented to person, place, and time. She has normal reflexes. Skin: Skin is warm and dry. She is not diaphoretic. Psychiatric: She has a normal mood and affect. Her speech is normal and behavior is normal. Thought content normal. Her mood appears not anxious. Thought content is not paranoid.  She does not exhibit a depressed mood. She expresses no homicidal and no suicidal ideation. Pts mood improved       ASSESSMENT and PLAN    ICD-10-CM ICD-9-CM    1. Recurrent major depressive disorder, in partial remission ([facility]) F33.41 296.35 escitalopram oxalate (LEXAPRO) 20 mg tablet     Follow-up Disposition:  Return in about 4 weeks (around 8/30/2017) for depression.

## 2017-08-02 NOTE — PROGRESS NOTES
Chief Complaint   Patient presents with    Medication Evaluation     with lexapro     1. Have you been to the ER, urgent care clinic since your last visit? Hospitalized since your last visit? No    2. Have you seen or consulted any other health care providers outside of the [address] [place] [address] since your last visit? Include any pap smears or colon screening.  No

## 2017-08-02 NOTE — MR AVS SNAPSHOT
Visit Information Date & Time Provider Department Dept. Phone Encounter #  
 8/2/2017  2:30 PM Kojo Vazquez PA-C [facility] [phone] [id number] Follow-up Instructions Return in about 4 weeks (around 8/30/2017) for depression. Your Appointments 10/27/2017  1:20 PM  
Follow Up with Loida Jamison MD  
[facility] [address] [facility]) Appt Note: Friday, October 27th 2017 @ 1:20PM.  
 [address] [place] [id number]  
[phone]  Jose Miguel [id number] Upcoming Health Maintenance Date Due INFLUENZA AGE 9 TO ADULT 8/1/2017 PAP AKA CERVICAL CYTOLOGY 3/30/2018* MEDICARE YEARLY EXAM 4/26/2018 DTaP/Tdap/Td series (2 - Td) 7/12/2026 *Topic was postponed. The date shown is not the original due date. Allergies as of 8/2/2017  Review Complete On: 8/2/2017 By: Kojo Vazquez PA-C Severity Noted Reaction Type Reactions Bactrim [Sulfamethoprim Ds]  02/20/2015    Rash Current Immunizations  Reviewed on 12/28/2012 No immunizations on file. Not reviewed this visit You Were Diagnosed With   
  
 Codes Comments Recurrent major depressive disorder, [facility]    -  Primary ICD-10-CM: F33.41 
ICD-9-CM: 296.35 Vitals BP Pulse Temp Resp Height(growth percentile) Weight(growth percentile) 117/80 (BP 1 Location: Left arm, BP Patient Position: Sitting) 83 98 °F (36.7 °C) (Oral) 18 5' 8\" (1.727 m) 276 lb (125.2 kg) SpO2 BMI OB Status Smoking Status 95% 41.97 kg/m2 Postmenopausal Former Smoker BMI and BSA Data Body Mass Index Body Surface Area 41.97 kg/m 2 2.45 m 2 Preferred Pharmacy Pharmacy Name Phone [facility] [address] [address] [facility] [phone] Your Updated Medication List  
  
   
 This list is accurate as of: 8/2/17  3:10 PM.  Always use your most recent med list.  
  
  
  
  
 escitalopram oxalate 20 mg tablet Commonly known as:  Cunningham Abts Take 1 Tab by mouth daily. * HYDROmorphone 4 mg tablet Commonly known as:  DILAUDID Take 1 Tab by mouth four (4) times daily for 30 days. Max Daily Amount: 16 mg. Prn severe pain  Indications: Pain  
  
 * HYDROmorphone 4 mg tablet Commonly known as:  DILAUDID Take 1 Tab by mouth four (4) times daily for 30 days. Max Daily Amount: 16 mg. Prn severe pain  Indications: Pain Start taking on:  8/26/2017  
  
 * HYDROmorphone 4 mg tablet Commonly known as:  DILAUDID Take 1 Tab by mouth four (4) times daily for 30 days. Max Daily Amount: 16 mg. Prn severe pain  Indications: Pain, Severe Pain Start taking on:  9/24/2017  
  
 lisinopril-hydroCHLOROthiazide 20-12.5 mg per tablet Commonly known as:  Gatito Michelle Take 2 Tabs by mouth daily. * methadone 10 mg tablet Commonly known as:  DOLOPHINE Take 2 Tabs by mouth every eight (8) hours for 30 days. For chronic pain. Indications: Chronic Pain, Severe Pain * methadone 10 mg tablet Commonly known as:  DOLOPHINE Take 2 Tabs by mouth every eight (8) hours for 30 days. For chronic pain. Indications: Chronic Pain, Severe Pain Start taking on:  8/26/2017 * methadone 10 mg tablet Commonly known as:  DOLOPHINE Take 2 Tabs by mouth every eight (8) hours for 30 days. For chronic pain. Indications: Chronic Pain, Severe Pain Start taking on:  9/24/2017  
  
 naloxone 4 mg/actuation Spry 4 mg by Nasal route as needed for up to 2 doses. Indications: OPIOID TOXICITY  
  
 OTHER Portable lightweight wheelchair * Notice: This list has 6 medication(s) that are the same as other medications prescribed for you. Read the directions carefully, and ask your doctor or other care provider to review them with you. Prescriptions Sent to Pharmacy Refills  
 escitalopram oxalate (LEXAPRO) 20 mg tablet 0 Sig: Take 1 Tab by mouth daily. Class: Normal  
 Pharm[facility] [address], [address] [facility] #: [phone] Route: Oral  
  
Follow-up Instructions Return in about 4 weeks (around 8/30/2017) for depression. Patient Instructions Depression and Chronic Disease: Care Instructions Your Care Instructions A chronic disease is one that you have for a long time. Some chronic diseases can be controlled, but they usually cannot be cured. Depression is common in people with chronic diseases, but it often goes unnoticed. Many people have concerns about seeking treatment for a mental health problem. You may think it's a sign of weakness, or you don't want people to know about it. It's important to overcome these reasons for not seeking treatment. Treating depression or anxiety is good for your health. Follow-up care is a key part of your treatment and safety. Be sure to make and go to all appointments, and call your doctor if you are having problems. It's also a good idea to know your test results and keep a list of the medicines you take. How can you care for yourself at home? Watch for symptoms of depression The symptoms of depression are often subtle at first. You may think they are caused by your disease rather than depression. Or you may think it is normal to be depressed when you have a chronic disease. If you are depressed you may: · Feel sad or hopeless. · Feel guilty or worthless. · Not enjoy the things you used to enjoy. · Feel hopeless, as though life is not worth living. · Have trouble thinking or remembering. · Have low energy, and you may not eat or sleep well. · Pull away from others. · Think often about death or killing yourself. (Keep the numbers for these national suicide hotlines: 5-281-297-TALK [1-910.934.9339] and 7-177-VAZIZCY [1-197.732.1137]. ) Get treatment By treating your depression, you can feel more hopeful and have more energy. If you feel better, you may take better care of yourself, so your health may improve. · Talk to your doctor if you have any changes in mood during treatment for your disease. · Ask your doctor for help. Counseling, antidepressant medicine, or a combination of the two can help most people with depression. Often a combination works best. Counseling can also help you cope with having a chronic disease. When should you call for help? Call 911 anytime you think you may need emergency care. For example, call if: 
· You feel like hurting yourself or someone else. · Someone you know has depression and is about to attempt or is attempting suicide. Call your doctor now or seek immediate medical care if: 
· You hear voices. · Someone you know has depression and: 
¨ Starts to give away his or her possessions. ¨ Uses illegal drugs or drinks alcohol heavily. ¨ Talks or writes about death, including writing suicide notes or talking about guns, knives, or pills. ¨ Starts to spend a lot of time alone. ¨ Acts very aggressively or suddenly appears calm. Watch closely for changes in your health, and be sure to contact your doctor if: 
· You do not get better as expected. Where can you learn more? Go to http://zenia-alana.info/. Enter O554 in the search box to learn more about \"Depression and Chronic Disease: Care Instructions. \" Current as of: July 26, 2016 Content Version: 11.3 © 9787-8186 [employer]. Care instructions adapted under license by [employer] (which disclaims liability or warranty for this information). If you have questions about a medical condition or this instruction, always ask your healthcare professional. [place] [address] any warranty or liability for your use of this information. Introducing [facility] & HEALTH SERVICES! [facility] introduces InPlace patient portal. Now you can access parts of your medical record, email your doctor's office, and request medication refills online. 1. In your internet browser, go to [email or web address] 2. Click on the First Time User? Click Here link in the Sign In box. You will see the New Member Sign Up page. 3. Enter your InPlace Access Code exactly as it appears below. You will not need to use this code after youve completed the sign-up process. If you do not sign up before the expiration date, you must request a new code. · InPlace Access Code: [email or web address] Expires: 10/29/2017  3:17 PM 
 
4. Enter the last four digits of your Social Security Number (xxxx) and Date of Birth (mm/dd/yyyy) as indicated and click Submit. You will be taken to the next sign-up page. 5. Create a InPlace ID. This will be your InPlace login ID and cannot be changed, so think of one that is secure and easy to remember. 6. Create a InPlace password. You can change your password at any time. 7. Enter your Password Reset Question and Answer. This can be used at a later time if you forget your password. 8. Enter your e-mail address. You will receive e-mail notification when new information is available in [address] 9. Click Sign Up. You can now view and download portions of your medical record. 10. Click the Download Summary menu link to download a portable copy of your medical information. If you have questions, please visit the Frequently Asked Questions section of the InPlace website. Remember, InPlace is NOT to be used for urgent needs. For medical emergencies, dial 911. Now available from your iPhone and Android! Please provide this summary of care documentation to your next provider. Your primary care clinician is listed as Tahira Soares. If you have any questions after today's visit, please call [phone].

## 2017-08-02 NOTE — PATIENT INSTRUCTIONS
Depression and Chronic Disease: Care Instructions  Your Care Instructions  A chronic disease is one that you have for a long time. Some chronic diseases can be controlled, but they usually cannot be cured. Depression is common in people with chronic diseases, but it often goes unnoticed. Many people have concerns about seeking treatment for a mental health problem. You may think it's a sign of weakness, or you don't want people to know about it. It's important to overcome these reasons for not seeking treatment. Treating depression or anxiety is good for your health. Follow-up care is a key part of your treatment and safety. Be sure to make and go to all appointments, and call your doctor if you are having problems. It's also a good idea to know your test results and keep a list of the medicines you take. How can you care for yourself at home? Watch for symptoms of depression  The symptoms of depression are often subtle at first. You may think they are caused by your disease rather than depression. Or you may think it is normal to be depressed when you have a chronic disease. If you are depressed you may:  · Feel sad or hopeless. · Feel guilty or worthless. · Not enjoy the things you used to enjoy. · Feel hopeless, as though life is not worth living. · Have trouble thinking or remembering. · Have low energy, and you may not eat or sleep well. · Pull away from others. · Think often about death or killing yourself. (Keep the numbers for these national suicide hotlines: 2-148-296-TALK [1-663.606.4027] and 9-017-SZKQUVW [1-999.923.2369]. )  Get treatment  By treating your depression, you can feel more hopeful and have more energy. If you feel better, you may take better care of yourself, so your health may improve. · Talk to your doctor if you have any changes in mood during treatment for your disease. · Ask your doctor for help.  Counseling, antidepressant medicine, or a combination of the two can help most people with depression. Often a combination works best. Counseling can also help you cope with having a chronic disease. When should you call for help? Call 911 anytime you think you may need emergency care. For example, call if:  · You feel like hurting yourself or someone else. · Someone you know has depression and is about to attempt or is attempting suicide. Call your doctor now or seek immediate medical care if:  · You hear voices. · Someone you know has depression and:  ¨ Starts to give away his or her possessions. ¨ Uses illegal drugs or drinks alcohol heavily. ¨ Talks or writes about death, including writing suicide notes or talking about guns, knives, or pills. ¨ Starts to spend a lot of time alone. ¨ Acts very aggressively or suddenly appears calm. Watch closely for changes in your health, and be sure to contact your doctor if:  · You do not get better as expected. Where can you learn more? Go to http://zenia-alana.info/. Enter Z449 in the search box to learn more about \"Depression and Chronic Disease: Care Instructions. \"  Current as of: July 26, 2016  Content Version: 11.3  © 7394-4889 [employer]. Care instructions adapted under license by [employer] (which disclaims liability or warranty for this information). If you have questions about a medical condition or this instruction, always ask your healthcare professional. [address] any warranty or liability for your use of this information.

## 2017-08-07 ENCOUNTER — DOCUMENTATION ONLY (OUTPATIENT)
Dept: PAIN MANAGEMENT | Age: 47
End: 2017-08-07

## 2017-08-07 NOTE — PROGRESS NOTES
The pt's order for a lumbar MRI was faxed over to the scheduling dept at [facility].  A fax confirmation was received and they will contact the pt to schedule this test.

## 2017-08-08 DIAGNOSIS — I10 ESSENTIAL HYPERTENSION: ICD-10-CM

## 2017-08-08 RX ORDER — LISINOPRIL AND HYDROCHLOROTHIAZIDE 12.5; 2 MG/1; MG/1
2 TABLET ORAL DAILY
Qty: 60 TAB | Refills: 1 | Status: SHIPPED | OUTPATIENT
Start: 2017-08-08 | End: 2017-08-11 | Stop reason: SDUPTHER

## 2017-08-11 DIAGNOSIS — I10 ESSENTIAL HYPERTENSION: ICD-10-CM

## 2017-08-11 RX ORDER — LISINOPRIL AND HYDROCHLOROTHIAZIDE 12.5; 2 MG/1; MG/1
2 TABLET ORAL DAILY
Qty: 180 TAB | Refills: 0 | Status: SHIPPED | OUTPATIENT
Start: 2017-08-11 | End: 2017-11-14 | Stop reason: SDUPTHER

## 2017-08-29 DIAGNOSIS — F33.41 RECURRENT MAJOR DEPRESSIVE DISORDER, IN PARTIAL REMISSION (HCC): ICD-10-CM

## 2017-08-29 NOTE — TELEPHONE ENCOUNTER
Argelia Sabrina called to advise Anjali Ortiz that the Lexapro is working very well for her. Please send a refill to [facility] [facility].

## 2017-08-30 RX ORDER — ESCITALOPRAM OXALATE 20 MG/1
20 TABLET ORAL DAILY
Qty: 30 TAB | Refills: 2 | Status: SHIPPED | OUTPATIENT
Start: 2017-08-30 | End: 2017-10-25 | Stop reason: SDUPTHER

## 2017-10-17 ENCOUNTER — OFFICE VISIT (OUTPATIENT)
Dept: INTERNAL MEDICINE CLINIC | Age: 47
End: 2017-10-17

## 2017-10-17 VITALS
WEIGHT: 268 LBS | HEIGHT: 68 IN | DIASTOLIC BLOOD PRESSURE: 68 MMHG | SYSTOLIC BLOOD PRESSURE: 103 MMHG | TEMPERATURE: 98.1 F | RESPIRATION RATE: 18 BRPM | OXYGEN SATURATION: 98 % | HEART RATE: 71 BPM | BODY MASS INDEX: 40.62 KG/M2

## 2017-10-17 DIAGNOSIS — M47.817 LUMBOSACRAL SPONDYLOSIS WITHOUT MYELOPATHY: Primary | ICD-10-CM

## 2017-10-17 DIAGNOSIS — G89.4 CHRONIC PAIN SYNDROME: ICD-10-CM

## 2017-10-17 NOTE — PROGRESS NOTES
Ana Marcos presents today at the clinic for      Chief Complaint   Patient presents with    Pain (Chronic)    Medication Evaluation        Wt Readings from Last 3 Encounters:   10/17/17 268 lb (121.6 kg)   08/02/17 276 lb (125.2 kg)   07/28/17 281 lb (127.5 kg)     Temp Readings from Last 3 Encounters:   10/17/17 98.1 °F (36.7 °C) (Oral)   08/02/17 98 °F (36.7 °C) (Oral)   07/28/17 98 °F (36.7 °C)     BP Readings from Last 3 Encounters:   10/17/17 103/68   08/02/17 117/80   07/28/17 101/63     Pulse Readings from Last 3 Encounters:   10/17/17 71   08/02/17 83   07/28/17 81       There are no preventive care reminders to display for this patient. Coordination of Care:    1. Have you been to the ER, urgent care clinic since your last visit? Hospitalized since your last visit? No    2. Have you seen or consulted any other health care providers outside of the [address] [place] [address] since your last visit? Include any pap smears or colon screening.  No

## 2017-10-17 NOTE — MR AVS SNAPSHOT
Visit Information Date & Time Provider Department Dept. Phone Encounter #  
 10/17/2017  3:15 PM Beto Yeh PA-C [facility] [phone] [id number] Follow-up Instructions Return if symptoms worsen or fail to improve. Upcoming Health Maintenance Date Due  
 PAP AKA CERVICAL CYTOLOGY 3/30/2018* MEDICARE YEARLY EXAM 4/26/2018 DTaP/Tdap/Td series (2 - Td) 7/12/2026 *Topic was postponed. The date shown is not the original due date. Allergies as of 10/17/2017  Review Complete On: 10/17/2017 By: Negra Mcwilliams LPN Severity Noted Reaction Type Reactions Bactrim [Sulfamethoprim Ds]  02/20/2015    Rash Current Immunizations  Reviewed on 12/28/2012 No immunizations on file. Not reviewed this visit You Were Diagnosed With   
  
 Codes Comments Lumbosacral spondylosis without myelopathy    -  Primary ICD-10-CM: Y47.174 ICD-9-CM: 549. 3 Chronic pain syndrome     ICD-10-CM: G89.4 ICD-9-CM: 338. 4 Vitals BP Pulse Temp Resp Height(growth percentile) Weight(growth percentile) 103/68 (BP 1 Location: Left arm, BP Patient Position: Sitting) 71 98.1 °F (36.7 °C) (Oral) 18 5' 8\" (1.727 m) 268 lb (121.6 kg) SpO2 BMI OB Status Smoking Status 98% 40.75 kg/m2 Postmenopausal Former Smoker Vitals History BMI and BSA Data Body Mass Index Body Surface Area 40.75 kg/m 2 2.42 m 2 Preferred Pharmacy [facility] [address] [place] [address] [phone] Your Updated Medication List  
  
   
This list is accurate as of: 10/17/17 11:59 PM.  Always use your most recent med list.  
  
  
  
  
 escitalopram oxalate 20 mg tablet Commonly known as:  Zeke Dub Take 1 Tab by mouth daily. HYDROmorphone 4 mg tablet Commonly known as:  DILAUDID Take 1 Tab by mouth four (4) times daily for 30 days.  Max Daily Amount: 16 mg. Prn severe pain  Indications: Pain, Severe Pain  
  
 lisinopril-hydroCHLOROthiazide 20-12.5 mg per tablet Commonly known as:  Gearline Mellow Take 2 Tabs by mouth daily. methadone 10 mg tablet Commonly known as:  DOLOPHINE Take 2 Tabs by mouth every eight (8) hours for 30 days. For chronic pain. Indications: Chronic Pain, Severe Pain  
  
 naloxone 4 mg/actuation nasal spray Commonly known as:  NARCAN  
4 mg by Nasal route as needed for up to 2 doses. Indications: OPIOID TOXICITY  
  
 OTHER Portable lightweight wheelchair Follow-up Instructions Return if symptoms worsen or fail to improve. Patient Instructions Chronic Pain: Care Instructions Your Care Instructions Chronic pain is pain that lasts a long time (months or even years) and may or may not have a clear cause. It is different from acute pain, which usually does have a clear causelike an injury or illnessand gets better over time. Chronic pain: 
· Lasts over time but may vary from day to day. · Does not go away despite efforts to end it. · May disrupt your sleep and lead to fatigue. · May cause depression or anxiety. · May make your muscles tense, causing more pain. · Can disrupt your work, hobbies, home life, and relationships with friends and family. Chronic pain is a very real condition. It is not just in your head. Treatment can help and usually includes several methods used together, such as medicines, physical therapy, exercise, and other treatments. Learning how to relax and changing negative thought patterns can also help you cope. Chronic pain is complex. Taking an active role in your treatment will help you better manage your pain. Tell your doctor if you have trouble dealing with your pain. You may have to try several things before you find what works best for you. Follow-up care is a key part of your treatment and safety.  Be sure to make and go to all appointments, and call your doctor if you are having problems. Its also a good idea to know your test results and keep a list of the medicines you take. How can you care for yourself at home? · Pace yourself. Break up large jobs into smaller tasks. Save harder tasks for days when you have less pain, or go back and forth between hard tasks and easier ones. Take rest breaks. · Relax, and reduce stress. Relaxation techniques such as deep breathing or meditation can help. · Keep moving. Gentle, daily exercise can help reduce pain over the long run. Try low- or no-impact exercises such as walking, swimming, and stationary biking. Do stretches to stay flexible. · Try heat, cold packs, and massage. · Get enough sleep. Chronic pain can make you tired and drain your energy. Talk with your doctor if you have trouble sleeping because of pain. · Think positive. Your thoughts can affect your pain level. Do things that you enjoy to distract yourself when you have pain instead of focusing on the pain. See a movie, read a book, listen to music, or spend time with a friend. · If you think you are depressed, talk to your doctor about treatment. · Keep a daily pain diary. Record how your moods, thoughts, sleep patterns, activities, and medicine affect your pain. You may find that your pain is worse during or after certain activities or when you are feeling a certain emotion. Having a record of your pain can help you and your doctor find the best ways to treat your pain. · Take pain medicines exactly as directed. ¨ If the doctor gave you a prescription medicine for pain, take it as prescribed. ¨ If you are not taking a prescription pain medicine, ask your doctor if you can take an over-the-counter medicine. Reducing constipation caused by pain medicine · Include fruits, vegetables, beans, and whole grains in your diet each day. These foods are high in fiber. · Drink plenty of fluids, enough so that your urine is light yellow or clear like water. If you have kidney, heart, or liver disease and have to limit fluids, talk with your doctor before you increase the amount of fluids you drink. · If your doctor recommends it, get more exercise. Walking is a good choice. Bit by bit, increase the amount you walk every day. Try for at least 30 minutes on most days of the week. · Schedule time each day for a bowel movement. A daily routine may help. Take your time and do not strain when having a bowel movement. When should you call for help? Call your doctor now or seek immediate medical care if: 
· Your pain gets worse or is out of control. · You feel down or blue, or you do not enjoy things like you once did. You may be depressed, which is common in people with chronic pain. Depression can be treated. · You have vomiting or cramps for more than 2 hours. Watch closely for changes in your health, and be sure to contact your doctor if: 
· You cannot sleep because of pain. · You are very worried or anxious about your pain. · You have trouble taking your pain medicine. · You have any concerns about your pain medicine. · You have trouble with bowel movements, such as: 
¨ No bowel movement in 3 days. ¨ Blood in the anal area, in your stool, or on the toilet paper. ¨ Diarrhea for more than 24 hours. Where can you learn more? Go to http://zenia-alana.info/. Enter N004 in the search box to learn more about \"Chronic Pain: Care Instructions. \" Current as of: October 14, 2016 Content Version: 11.3 © 8765-8051 [employer]. Care instructions adapted under license by [employer] (which disclaims liability or warranty for this information).  If you have questions about a medical condition or this instruction, always ask your healthcare professional. [address] any warranty or liability for your use of this information. Introducing [facility] & HEALTH SERVICES! [address] introduces [employer] patient portal. Now you can access parts of your medical record, email your doctor's office, and request medication refills online. 1. In your internet browser, go to [email or web address] 2. Click on the First Time User? Click Here link in the Sign In box. You will see the New Member Sign Up page. 3. Enter your [employer] Access Code exactly as it appears below. You will not need to use this code after youve completed the sign-up process. If you do not sign up before the expiration date, you must request a new code. · [employer] Access Code: [email or web address] Expires: 10/29/2017  3:17 PM 
 
4. Enter the last four digits of your Social Security Number (xxxx) and Date of Birth (mm/dd/yyyy) as indicated and click Submit. You will be taken to the next sign-up page. 5. Create a [employer] ID. This will be your [employer] login ID and cannot be changed, so think of one that is secure and easy to remember. 6. Create a [employer] password. You can change your password at any time. 7. Enter your Password Reset Question and Answer. This can be used at a later time if you forget your password. 8. Enter your e-mail address. You will receive e-mail notification when new information is available in [address] 9. Click Sign Up. You can now view and download portions of your medical record. 10. Click the Download Summary menu link to download a portable copy of your medical information. If you have questions, please visit the Frequently Asked Questions section of the [employer] website. Remember, [employer] is NOT to be used for urgent needs. For medical emergencies, dial 911. Now available from your iPhone and Android! Please provide this summary of care documentation to your next provider. Your primary care clinician is listed as Darry Bumpers. [employer].  If you have any questions after today's visit, please call [phone].

## 2017-10-18 NOTE — PATIENT INSTRUCTIONS

## 2017-10-18 NOTE — PROGRESS NOTES
HISTORY OF PRESENT ILLNESS  Helene Nava is a 49 y.o. female. HPI   Pt is here to discuss pain management. Pt has been going to [facility] for the past 10yrs and was notified that Dr Neel Ramsey was leaving and the only Dr lopez will not see her. Pt only has 6d of meds left and does not know what to do. She is moving and leaving in the next 1-2 months but they would not even give her that nor would they recommend a place to go. Pt is very anxious and upset. Will work on referral just in case it is needed as well as try to contact [facility] office again. Allergies   Allergen Reactions    Bactrim [Sulfamethoprim Ds] Rash       Current Outpatient Prescriptions   Medication Sig    escitalopram oxalate (LEXAPRO) 20 mg tablet Take 1 Tab by mouth daily.  lisinopril-hydroCHLOROthiazide (PRINZIDE, ZESTORETIC) 20-12.5 mg per tablet Take 2 Tabs by mouth daily.  methadone (DOLOPHINE) 10 mg tablet Take 2 Tabs by mouth every eight (8) hours for 30 days. For chronic pain. Indications: Chronic Pain, Severe Pain    HYDROmorphone (DILAUDID) 4 mg tablet Take 1 Tab by mouth four (4) times daily for 30 days. Max Daily Amount: 16 mg. Prn severe pain  Indications: Pain, Severe Pain    naloxone 4 mg/actuation spry 4 mg by Nasal route as needed for up to 2 doses. Indications: OPIOID TOXICITY    OTHER Portable lightweight wheelchair     No current facility-administered medications for this visit. Review of Systems   Constitutional: Negative. Negative for chills, fever and malaise/fatigue. HENT: Negative. Negative for congestion, ear pain, sore throat and tinnitus. Eyes: Negative. Negative for blurred vision, double vision and photophobia. Respiratory: Negative. Negative for cough, shortness of breath and wheezing. Cardiovascular: Negative. Negative for chest pain, palpitations and leg swelling. Gastrointestinal: Negative. Negative for abdominal pain, heartburn, nausea and vomiting. Genitourinary: Negative. Negative for dysuria, frequency, hematuria and urgency. Musculoskeletal: Positive for back pain (chronic) and neck pain (chronic). Negative for joint pain and myalgias. Skin: Negative. Negative for itching and rash. Neurological: Negative. Negative for dizziness, tingling, tremors and headaches. Psychiatric/Behavioral: Positive for depression (improved). Negative for memory loss, substance abuse and suicidal ideas. The patient is nervous/anxious (over pain management). The patient does not have insomnia. Visit Vitals    /68 (BP 1 Location: Left arm, BP Patient Position: Sitting)    Pulse 71    Temp 98.1 °F (36.7 °C) (Oral)    Resp 18    Ht 5' 8\" (1.727 m)    Wt 268 lb (121.6 kg)    SpO2 98%    BMI 40.75 kg/m2       Physical Exam   Constitutional: She is oriented to person, place, and time. No distress. HENT:   Head: Normocephalic and atraumatic. Eyes: EOM are normal.   Neck: Normal range of motion. Neck supple. Cardiovascular: Normal rate, regular rhythm and normal heart sounds. Exam reveals no gallop and no friction rub. No murmur heard. Pulmonary/Chest: Effort normal and breath sounds normal. She has no wheezes. Abdominal: Soft. Bowel sounds are normal.   Neurological: She is alert and oriented to person, place, and time. She has normal reflexes. Skin: Skin is warm and dry. She is not diaphoretic. Psychiatric: She has a normal mood and affect. Her speech is normal and behavior is normal. Thought content normal. Her mood appears not anxious. Thought content is not paranoid. She does not exhibit a depressed mood. She expresses no homicidal and no suicidal ideation. ASSESSMENT and PLAN    ICD-10-CM ICD-9-CM    1. Lumbosacral spondylosis without myelopathy M47.817 721.3 REFERRAL TO PAIN CLINIC   2. Chronic pain syndrome G89.4 338.4 REFERRAL TO PAIN CLINIC     Follow-up Disposition:  Return if symptoms worsen or fail to improve.      Pt expressed understanding of visit summary and plans for any follow ups or referrals as well as any medications prescribed.

## 2017-10-19 NOTE — TELEPHONE ENCOUNTER
The pt was called and notified by the  staff that her appt for October was cancelled. The provider is no longer with the practice. The pt is tolerating her medications but will be running out soon and is asking about her prescriptions. A  was obtained and the last fill date for her dilaudid and methadone was 09/24/17. Due to appt availabilities, the reviewing provider states that the pt can have a month of medications. She is not due for a UDS at this time.

## 2017-10-20 RX ORDER — METHADONE HYDROCHLORIDE 10 MG/1
20 TABLET ORAL EVERY 8 HOURS
Qty: 180 TAB | Refills: 0 | Status: SHIPPED | OUTPATIENT
Start: 2017-10-23 | End: 2017-11-22

## 2017-10-20 RX ORDER — HYDROMORPHONE HYDROCHLORIDE 4 MG/1
4 TABLET ORAL 4 TIMES DAILY
Qty: 120 TAB | Refills: 0 | Status: SHIPPED | OUTPATIENT
Start: 2017-10-23 | End: 2017-11-22

## 2017-10-25 DIAGNOSIS — F33.41 RECURRENT MAJOR DEPRESSIVE DISORDER, IN PARTIAL REMISSION (HCC): ICD-10-CM

## 2017-10-26 RX ORDER — ESCITALOPRAM OXALATE 20 MG/1
20 TABLET ORAL DAILY
Qty: 30 TAB | Refills: 2 | Status: SHIPPED | OUTPATIENT
Start: 2017-10-26

## 2017-11-14 DIAGNOSIS — I10 ESSENTIAL HYPERTENSION: ICD-10-CM

## 2017-11-14 RX ORDER — LISINOPRIL AND HYDROCHLOROTHIAZIDE 12.5; 2 MG/1; MG/1
TABLET ORAL
Qty: 180 TAB | Refills: 0 | Status: SHIPPED | OUTPATIENT
Start: 2017-11-14